# Patient Record
Sex: FEMALE | Race: WHITE | Employment: UNEMPLOYED | URBAN - METROPOLITAN AREA
[De-identification: names, ages, dates, MRNs, and addresses within clinical notes are randomized per-mention and may not be internally consistent; named-entity substitution may affect disease eponyms.]

---

## 2019-09-18 ENCOUNTER — HOSPITAL ENCOUNTER (EMERGENCY)
Age: 54
Discharge: LEFT BEFORE DISCHARGE | End: 2019-09-18
Payer: COMMERCIAL

## 2019-09-18 VITALS
DIASTOLIC BLOOD PRESSURE: 122 MMHG | BODY MASS INDEX: 22.2 KG/M2 | TEMPERATURE: 97.8 F | HEART RATE: 95 BPM | HEIGHT: 64 IN | SYSTOLIC BLOOD PRESSURE: 165 MMHG | WEIGHT: 130 LBS | RESPIRATION RATE: 16 BRPM | OXYGEN SATURATION: 98 %

## 2019-09-18 DIAGNOSIS — I10 HYPERTENSION, UNSPECIFIED TYPE: ICD-10-CM

## 2019-09-18 DIAGNOSIS — T50.905A ADVERSE EFFECT OF DRUG, INITIAL ENCOUNTER: Primary | ICD-10-CM

## 2019-09-18 PROCEDURE — 99283 EMERGENCY DEPT VISIT LOW MDM: CPT

## 2019-09-18 NOTE — ED TRIAGE NOTES
Pt brought to the ED by EMS for c/o anxiety and possible reaction to amlodipine which she just started taking two days ago. Pt reports home issues with her daughter that has caused the anxiousness as well as pt has decreased her daily drinking from almost 12 beers daily down to 6beers daily. Pt reports having only two beers today. Pt is A&O x4 at this time, denies any pain and is tearful but cooperative. Pt denies any SI or HI.

## 2019-09-18 NOTE — ED NOTES
Bed: ED-14  Expected date:   Expected time:   Means of arrival:   Comments:  Jose Corral RN  09/18/19 [id number]

## 2019-09-18 NOTE — ED NOTES
Pt reports anxiety. States she is under a lot of stress. Pt has custody of her Grandchildren. States her son is in senior living, and she called the police on her daughter, who is a heroin addict. Pt started on Amlodipine 2 days ago, last dose was yesterday. Pt thinks she may be having a reaction. Pt states she feels \"high\". Pt reports drinking etoh daily, but has cut down from 12 beers daily to 6 recently.      Terry Joseph RN  09/18/19 [id number]

## 2019-09-18 NOTE — ED PROVIDER NOTES
(36.6 °C) (Oral)   Resp 16   Ht 5' 4\" (1.626 m)   Wt 130 lb (59 kg)   SpO2 98%   BMI 22.31 kg/m²    Constitutional:  Well developed, well nourished, no acute distress, pacing in the room. HENT:  NC/AT. External ears normal, canals clear, fluid visible behind bilateral TMs, no erythema. Nares patent. Oropharynx moist.  Neck:  Supple. Respiratory:  Lungs CTAB. Cardiovascular:  RRR. Vascular:  Distal pulses intact. GI:  Abdomen soft, non-tender. Bowel sounds active. Musculoskeletal:  No edema, no calf tenderness. Integument:  Warm and dry. No petechiae. Neurologic:  Alert & oriented. Psych: Pleasant affect. EKG    Patient refused. RADIOLOGY/PROCEDURES    Labs Reviewed - No data to display    No results found. ED COURSE & MEDICAL DECISION MAKING    Pertinent Labs & Imaging studies reviewed and interpreted. (See chart for details)  -  Patient seen and evaluated in the emergency department. -  Triage and nursing notes reviewed and incorporated. -  Old chart records reviewed and incorporated. -  Supervising physician was Dr. Anthony Montesinos. Patient was seen independently. -  Differential diagnosis includes: ACS, CHF, MI, PE, thoracic dissection, pericarditis, pericardial effusion, pneumonia, pneumothorax, GI cause, and others. -  Upon evaluation, patient states she believes this all to be panic attacks. Although possible, I recommended evaluation with an EKG, labs, and CXR particularly given patient's elevated BP. She initially agreed to have the EKG performed ONLY, however, when nurse went in to perform the EKG, she states she just wanted to leave. Again she was encouraged to stay for evaluation, advised that we cannot attribute this all to stress/anxiety without ruling out other causes first.  Patient continued to refuse, understands risks associated with leaving including worsening symptoms and possible death, signed out AMA. FINAL IMPRESSION    1.  Adverse effect of drug, initial

## 2021-09-07 ENCOUNTER — HOSPITAL ENCOUNTER (INPATIENT)
Age: 56
LOS: 3 days | Discharge: HOME | DRG: 871 | End: 2021-09-10
Attending: STUDENT IN AN ORGANIZED HEALTH CARE EDUCATION/TRAINING PROGRAM | Admitting: INTERNAL MEDICINE
Payer: COMMERCIAL

## 2021-09-07 ENCOUNTER — APPOINTMENT (OUTPATIENT)
Dept: GENERAL RADIOLOGY | Age: 56
DRG: 871 | End: 2021-09-07
Payer: COMMERCIAL

## 2021-09-07 ENCOUNTER — APPOINTMENT (OUTPATIENT)
Dept: CT IMAGING | Age: 56
DRG: 871 | End: 2021-09-07
Payer: COMMERCIAL

## 2021-09-07 DIAGNOSIS — E87.1 HYPONATREMIA: Primary | ICD-10-CM

## 2021-09-07 PROBLEM — U07.1 COVID-19: Status: ACTIVE | Noted: 2021-09-07

## 2021-09-07 LAB
ALBUMIN SERPL-MCNC: 3.9 GM/DL (ref 3.4–5)
ALCOHOL SCREEN SERUM: 0.25 %WT/VOL
ALP BLD-CCNC: 99 IU/L (ref 40–128)
ALT SERPL-CCNC: 150 U/L (ref 10–40)
ANION GAP SERPL CALCULATED.3IONS-SCNC: 16 MMOL/L (ref 4–16)
ANION GAP SERPL CALCULATED.3IONS-SCNC: 18 MMOL/L (ref 4–16)
ANION GAP SERPL CALCULATED.3IONS-SCNC: 18 MMOL/L (ref 4–16)
ANION GAP SERPL CALCULATED.3IONS-SCNC: 20 MMOL/L (ref 4–16)
ANION GAP SERPL CALCULATED.3IONS-SCNC: 21 MMOL/L (ref 4–16)
AST SERPL-CCNC: 229 IU/L (ref 15–37)
BASOPHILS ABSOLUTE: 0 K/CU MM
BASOPHILS RELATIVE PERCENT: 0.2 % (ref 0–1)
BILIRUB SERPL-MCNC: 0.4 MG/DL (ref 0–1)
BUN BLDV-MCNC: 4 MG/DL (ref 6–23)
BUN BLDV-MCNC: 4 MG/DL (ref 6–23)
BUN BLDV-MCNC: 5 MG/DL (ref 6–23)
BUN BLDV-MCNC: 5 MG/DL (ref 6–23)
BUN BLDV-MCNC: 6 MG/DL (ref 6–23)
CALCIUM SERPL-MCNC: 8.1 MG/DL (ref 8.3–10.6)
CALCIUM SERPL-MCNC: 8.5 MG/DL (ref 8.3–10.6)
CALCIUM SERPL-MCNC: 8.6 MG/DL (ref 8.3–10.6)
CALCIUM SERPL-MCNC: 8.9 MG/DL (ref 8.3–10.6)
CALCIUM SERPL-MCNC: 9 MG/DL (ref 8.3–10.6)
CHLORIDE BLD-SCNC: 80 MMOL/L (ref 99–110)
CHLORIDE BLD-SCNC: 92 MMOL/L (ref 99–110)
CHLORIDE BLD-SCNC: 96 MMOL/L (ref 99–110)
CHLORIDE BLD-SCNC: 97 MMOL/L (ref 99–110)
CHLORIDE BLD-SCNC: 97 MMOL/L (ref 99–110)
CO2: 17 MMOL/L (ref 21–32)
CO2: 20 MMOL/L (ref 21–32)
CO2: 20 MMOL/L (ref 21–32)
CO2: 21 MMOL/L (ref 21–32)
CO2: 23 MMOL/L (ref 21–32)
CREAT SERPL-MCNC: 0.3 MG/DL (ref 0.6–1.1)
CREAT SERPL-MCNC: 0.3 MG/DL (ref 0.6–1.1)
CREAT SERPL-MCNC: 0.4 MG/DL (ref 0.6–1.1)
DIFFERENTIAL TYPE: ABNORMAL
EOSINOPHILS ABSOLUTE: 0 K/CU MM
EOSINOPHILS RELATIVE PERCENT: 0 % (ref 0–3)
GFR AFRICAN AMERICAN: >60 ML/MIN/1.73M2
GFR NON-AFRICAN AMERICAN: >60 ML/MIN/1.73M2
GLUCOSE BLD-MCNC: 105 MG/DL (ref 70–99)
GLUCOSE BLD-MCNC: 116 MG/DL (ref 70–99)
GLUCOSE BLD-MCNC: 142 MG/DL (ref 70–99)
GLUCOSE BLD-MCNC: 76 MG/DL (ref 70–99)
GLUCOSE BLD-MCNC: 92 MG/DL (ref 70–99)
GLUCOSE BLD-MCNC: 97 MG/DL (ref 70–99)
HCT VFR BLD CALC: 44.2 % (ref 37–47)
HEMOGLOBIN: 15.7 GM/DL (ref 12.5–16)
IMMATURE NEUTROPHIL %: 1.1 % (ref 0–0.43)
INR BLD: 0.82 INDEX
LIPASE: 104 IU/L (ref 13–60)
LYMPHOCYTES ABSOLUTE: 0.4 K/CU MM
LYMPHOCYTES RELATIVE PERCENT: 4.9 % (ref 24–44)
MCH RBC QN AUTO: 34.8 PG (ref 27–31)
MCHC RBC AUTO-ENTMCNC: 35.5 % (ref 32–36)
MCV RBC AUTO: 98 FL (ref 78–100)
MONOCYTES ABSOLUTE: 0.9 K/CU MM
MONOCYTES RELATIVE PERCENT: 11.3 % (ref 0–4)
NUCLEATED RBC %: 0 %
PDW BLD-RTO: 13.5 % (ref 11.7–14.9)
PLATELET # BLD: 113 K/CU MM (ref 140–440)
PMV BLD AUTO: 11.1 FL (ref 7.5–11.1)
POTASSIUM SERPL-SCNC: 3.4 MMOL/L (ref 3.5–5.1)
POTASSIUM SERPL-SCNC: 3.6 MMOL/L (ref 3.5–5.1)
POTASSIUM SERPL-SCNC: 3.6 MMOL/L (ref 3.5–5.1)
POTASSIUM SERPL-SCNC: 3.8 MMOL/L (ref 3.5–5.1)
POTASSIUM SERPL-SCNC: 3.9 MMOL/L (ref 3.5–5.1)
PROCALCITONIN: 2.05
PROTHROMBIN TIME: 10.5 SECONDS (ref 11.7–14.5)
RBC # BLD: 4.51 M/CU MM (ref 4.2–5.4)
SARS-COV-2, NAAT: DETECTED
SEGMENTED NEUTROPHILS ABSOLUTE COUNT: 6.7 K/CU MM
SEGMENTED NEUTROPHILS RELATIVE PERCENT: 82.5 % (ref 36–66)
SODIUM BLD-SCNC: 120 MMOL/L (ref 135–145)
SODIUM BLD-SCNC: 130 MMOL/L (ref 135–145)
SODIUM BLD-SCNC: 134 MMOL/L (ref 135–145)
SODIUM BLD-SCNC: 136 MMOL/L (ref 135–145)
SODIUM BLD-SCNC: 136 MMOL/L (ref 135–145)
SOURCE: ABNORMAL
TOTAL IMMATURE NEUTOROPHIL: 0.09 K/CU MM
TOTAL NUCLEATED RBC: 0 K/CU MM
TOTAL PROTEIN: 7.2 GM/DL (ref 6.4–8.2)
WBC # BLD: 8.2 K/CU MM (ref 4–10.5)

## 2021-09-07 PROCEDURE — 83690 ASSAY OF LIPASE: CPT

## 2021-09-07 PROCEDURE — 2060000000 HC ICU INTERMEDIATE R&B

## 2021-09-07 PROCEDURE — 6370000000 HC RX 637 (ALT 250 FOR IP): Performed by: STUDENT IN AN ORGANIZED HEALTH CARE EDUCATION/TRAINING PROGRAM

## 2021-09-07 PROCEDURE — 99285 EMERGENCY DEPT VISIT HI MDM: CPT

## 2021-09-07 PROCEDURE — 6360000002 HC RX W HCPCS: Performed by: INTERNAL MEDICINE

## 2021-09-07 PROCEDURE — G0480 DRUG TEST DEF 1-7 CLASSES: HCPCS

## 2021-09-07 PROCEDURE — 1200000000 HC SEMI PRIVATE

## 2021-09-07 PROCEDURE — 85025 COMPLETE CBC W/AUTO DIFF WBC: CPT

## 2021-09-07 PROCEDURE — 96374 THER/PROPH/DIAG INJ IV PUSH: CPT

## 2021-09-07 PROCEDURE — 70450 CT HEAD/BRAIN W/O DYE: CPT

## 2021-09-07 PROCEDURE — 36415 COLL VENOUS BLD VENIPUNCTURE: CPT

## 2021-09-07 PROCEDURE — 94761 N-INVAS EAR/PLS OXIMETRY MLT: CPT

## 2021-09-07 PROCEDURE — 2580000003 HC RX 258: Performed by: STUDENT IN AN ORGANIZED HEALTH CARE EDUCATION/TRAINING PROGRAM

## 2021-09-07 PROCEDURE — 72125 CT NECK SPINE W/O DYE: CPT

## 2021-09-07 PROCEDURE — 80048 BASIC METABOLIC PNL TOTAL CA: CPT

## 2021-09-07 PROCEDURE — 2700000000 HC OXYGEN THERAPY PER DAY

## 2021-09-07 PROCEDURE — 80053 COMPREHEN METABOLIC PANEL: CPT

## 2021-09-07 PROCEDURE — 71045 X-RAY EXAM CHEST 1 VIEW: CPT

## 2021-09-07 PROCEDURE — 85610 PROTHROMBIN TIME: CPT

## 2021-09-07 PROCEDURE — 6360000002 HC RX W HCPCS: Performed by: STUDENT IN AN ORGANIZED HEALTH CARE EDUCATION/TRAINING PROGRAM

## 2021-09-07 PROCEDURE — 87635 SARS-COV-2 COVID-19 AMP PRB: CPT

## 2021-09-07 PROCEDURE — 84145 PROCALCITONIN (PCT): CPT

## 2021-09-07 PROCEDURE — 2580000003 HC RX 258: Performed by: INTERNAL MEDICINE

## 2021-09-07 PROCEDURE — 82962 GLUCOSE BLOOD TEST: CPT

## 2021-09-07 PROCEDURE — 94640 AIRWAY INHALATION TREATMENT: CPT

## 2021-09-07 RX ORDER — SODIUM CHLORIDE 0.9 % (FLUSH) 0.9 %
5-40 SYRINGE (ML) INJECTION PRN
Status: DISCONTINUED | OUTPATIENT
Start: 2021-09-07 | End: 2021-09-10 | Stop reason: HOSPADM

## 2021-09-07 RX ORDER — ACETAMINOPHEN 325 MG/1
650 TABLET ORAL EVERY 6 HOURS PRN
Status: DISCONTINUED | OUTPATIENT
Start: 2021-09-07 | End: 2021-09-10 | Stop reason: HOSPADM

## 2021-09-07 RX ORDER — ONDANSETRON 2 MG/ML
4 INJECTION INTRAMUSCULAR; INTRAVENOUS EVERY 6 HOURS PRN
Status: DISCONTINUED | OUTPATIENT
Start: 2021-09-07 | End: 2021-09-10 | Stop reason: HOSPADM

## 2021-09-07 RX ORDER — LORAZEPAM 2 MG/ML
3 INJECTION INTRAMUSCULAR
Status: DISCONTINUED | OUTPATIENT
Start: 2021-09-07 | End: 2021-09-10 | Stop reason: HOSPADM

## 2021-09-07 RX ORDER — LORAZEPAM 1 MG/1
1 TABLET ORAL
Status: DISCONTINUED | OUTPATIENT
Start: 2021-09-07 | End: 2021-09-10 | Stop reason: HOSPADM

## 2021-09-07 RX ORDER — ACETAMINOPHEN 650 MG/1
650 SUPPOSITORY RECTAL EVERY 6 HOURS PRN
Status: DISCONTINUED | OUTPATIENT
Start: 2021-09-07 | End: 2021-09-10 | Stop reason: HOSPADM

## 2021-09-07 RX ORDER — LORAZEPAM 1 MG/1
2 TABLET ORAL
Status: DISCONTINUED | OUTPATIENT
Start: 2021-09-07 | End: 2021-09-10 | Stop reason: HOSPADM

## 2021-09-07 RX ORDER — SODIUM CHLORIDE 9 MG/ML
INJECTION, SOLUTION INTRAVENOUS CONTINUOUS
Status: DISCONTINUED | OUTPATIENT
Start: 2021-09-07 | End: 2021-09-08

## 2021-09-07 RX ORDER — POLYETHYLENE GLYCOL 3350 17 G/17G
17 POWDER, FOR SOLUTION ORAL DAILY PRN
Status: DISCONTINUED | OUTPATIENT
Start: 2021-09-07 | End: 2021-09-10 | Stop reason: HOSPADM

## 2021-09-07 RX ORDER — METHYLPREDNISOLONE SODIUM SUCCINATE 125 MG/2ML
80 INJECTION, POWDER, LYOPHILIZED, FOR SOLUTION INTRAMUSCULAR; INTRAVENOUS ONCE
Status: COMPLETED | OUTPATIENT
Start: 2021-09-07 | End: 2021-09-07

## 2021-09-07 RX ORDER — ALBUTEROL SULFATE 2.5 MG/3ML
5 SOLUTION RESPIRATORY (INHALATION) ONCE
Status: COMPLETED | OUTPATIENT
Start: 2021-09-07 | End: 2021-09-07

## 2021-09-07 RX ORDER — ACETAMINOPHEN 325 MG/1
650 TABLET ORAL ONCE
Status: COMPLETED | OUTPATIENT
Start: 2021-09-07 | End: 2021-09-07

## 2021-09-07 RX ORDER — LORAZEPAM 2 MG/ML
1 INJECTION INTRAMUSCULAR
Status: DISCONTINUED | OUTPATIENT
Start: 2021-09-07 | End: 2021-09-10 | Stop reason: HOSPADM

## 2021-09-07 RX ORDER — IPRATROPIUM BROMIDE AND ALBUTEROL SULFATE 2.5; .5 MG/3ML; MG/3ML
1 SOLUTION RESPIRATORY (INHALATION) ONCE
Status: COMPLETED | OUTPATIENT
Start: 2021-09-07 | End: 2021-09-07

## 2021-09-07 RX ORDER — LORAZEPAM 1 MG/1
4 TABLET ORAL
Status: DISCONTINUED | OUTPATIENT
Start: 2021-09-07 | End: 2021-09-10 | Stop reason: HOSPADM

## 2021-09-07 RX ORDER — SODIUM CHLORIDE 0.9 % (FLUSH) 0.9 %
5-40 SYRINGE (ML) INJECTION EVERY 12 HOURS SCHEDULED
Status: DISCONTINUED | OUTPATIENT
Start: 2021-09-07 | End: 2021-09-10 | Stop reason: HOSPADM

## 2021-09-07 RX ORDER — 0.9 % SODIUM CHLORIDE 0.9 %
1000 INTRAVENOUS SOLUTION INTRAVENOUS ONCE
Status: COMPLETED | OUTPATIENT
Start: 2021-09-07 | End: 2021-09-07

## 2021-09-07 RX ORDER — ONDANSETRON 4 MG/1
4 TABLET, ORALLY DISINTEGRATING ORAL EVERY 8 HOURS PRN
Status: DISCONTINUED | OUTPATIENT
Start: 2021-09-07 | End: 2021-09-10 | Stop reason: HOSPADM

## 2021-09-07 RX ORDER — LORAZEPAM 2 MG/ML
2 INJECTION INTRAMUSCULAR
Status: DISCONTINUED | OUTPATIENT
Start: 2021-09-07 | End: 2021-09-10 | Stop reason: HOSPADM

## 2021-09-07 RX ORDER — SODIUM CHLORIDE 9 MG/ML
25 INJECTION, SOLUTION INTRAVENOUS PRN
Status: DISCONTINUED | OUTPATIENT
Start: 2021-09-07 | End: 2021-09-10 | Stop reason: HOSPADM

## 2021-09-07 RX ORDER — DEXAMETHASONE 4 MG/1
6 TABLET ORAL DAILY
Status: DISCONTINUED | OUTPATIENT
Start: 2021-09-07 | End: 2021-09-10

## 2021-09-07 RX ORDER — LORAZEPAM 2 MG/ML
4 INJECTION INTRAMUSCULAR
Status: DISCONTINUED | OUTPATIENT
Start: 2021-09-07 | End: 2021-09-10 | Stop reason: HOSPADM

## 2021-09-07 RX ORDER — LORAZEPAM 1 MG/1
3 TABLET ORAL
Status: DISCONTINUED | OUTPATIENT
Start: 2021-09-07 | End: 2021-09-10 | Stop reason: HOSPADM

## 2021-09-07 RX ADMIN — ENOXAPARIN SODIUM 30 MG: 30 INJECTION SUBCUTANEOUS at 20:15

## 2021-09-07 RX ADMIN — METHYLPREDNISOLONE SODIUM SUCCINATE 80 MG: 125 INJECTION, POWDER, LYOPHILIZED, FOR SOLUTION INTRAMUSCULAR; INTRAVENOUS at 04:33

## 2021-09-07 RX ADMIN — ALBUTEROL SULFATE 5 MG: 2.5 SOLUTION RESPIRATORY (INHALATION) at 04:15

## 2021-09-07 RX ADMIN — IPRATROPIUM BROMIDE AND ALBUTEROL SULFATE 1 AMPULE: .5; 3 SOLUTION RESPIRATORY (INHALATION) at 04:20

## 2021-09-07 RX ADMIN — SODIUM CHLORIDE 1000 ML: 9 INJECTION, SOLUTION INTRAVENOUS at 06:37

## 2021-09-07 RX ADMIN — SODIUM CHLORIDE: 9 INJECTION, SOLUTION INTRAVENOUS at 10:25

## 2021-09-07 RX ADMIN — ACETAMINOPHEN 650 MG: 325 TABLET ORAL at 04:33

## 2021-09-07 RX ADMIN — DEXAMETHASONE 6 MG: 4 TABLET ORAL at 10:24

## 2021-09-07 RX ADMIN — ENOXAPARIN SODIUM 30 MG: 30 INJECTION SUBCUTANEOUS at 10:25

## 2021-09-07 RX ADMIN — DEXAMETHASONE 6 MG: 4 TABLET ORAL at 07:10

## 2021-09-07 ASSESSMENT — PAIN SCALES - GENERAL: PAINLEVEL_OUTOF10: 6

## 2021-09-07 ASSESSMENT — LIFESTYLE VARIABLES
HOW OFTEN DO YOU HAVE A DRINK CONTAINING ALCOHOL: 4 OR MORE TIMES A WEEK
HOW MANY STANDARD DRINKS CONTAINING ALCOHOL DO YOU HAVE ON A TYPICAL DAY: 10 OR MORE

## 2021-09-07 NOTE — CARE COORDINATION
Chart reviewed. Patient is from home; appears independent PTA. She does not have a PCP at this time; list added to AVS. She has insurance that assists with Rx when needed. CM will contact patient by phone to discuss consult (Rehab- ED MD documented ETOH use of 12 beers daily. David Stephens RN     1020 Attempted to contact patient by phone; no answer. CM will continue to attempt contact.  David Stephens RN

## 2021-09-07 NOTE — ED TRIAGE NOTES
called EMS [facility]). Pt was found on the ground by , it is assumed she fell. Per  she is an alchoholic. Pt is altered, has been incontinent of urine, and has been combative per EMS.

## 2021-09-07 NOTE — ED PROVIDER NOTES
EMERGENCY DEPARTMENT ENCOUNTER      CHIEF COMPLAINT    Chief Complaint   Patient presents with    Shortness of Breath    Loss of Consciousness    Alcohol Intoxication    Fall       HPI    Veto Fothergill is a 63 y.o. female with history significant for alcohol use, COPD not on home oxygen, hypertension who presents with shortness of breath alcohol intoxication fall and possible loss of consciousness. Patient arrived by ambulance,  stated that while he woke up tonight he heard a thump in the bathroom and that he found the patient on the floor in a pile of urine, therefore he called the squad, patient is intoxicated stated that she did drink a few hours ago, does not remember the episode of the fall does not know whether she has loss of consciousness denies any headache nausea or vomiting  did state that patient has been having shortness of breath for the last few days and been having some generalized body ache and patient did have a fever here and the son has been sick, patient has not been vaccinated against COVID. REVIEW OF SYSTEMS    Constitutional: Denies chills, fatigue, unexpected weight loss or fever. HENT: Denies sore throat or rhinorrhea. Eyes: Denies vision changes. Respiratory: + shortness of breath or cough. Cardiovascular: Denies chest pain, leg swelling or palpitations. Gastrointestinal: Denies abdominal pain, diarrhea, nausea and vomiting. Genitourinary: Denies dysuria or hematuria. Skin: Denies rashes or wounds. MSK: Denies joint pain. Neurologic: Denies headache, lightheadedness, numbness, or weakness.  ?  LOC  Hematologic/lymphatic: Denies unexpected weight loss, night sweats  Endocrine: No polyuria, polydipsia, or polyphagia      Pertinent positives and negatives are delineated in HPI and ROS above, all other systems are reviewed and are negative    PAST MEDICAL HISTORY    Past Medical History:   Diagnosis Date    Hypertension      Medical history reviewed and no pertinent past medical history other than the ones mentioned above    SURGICAL HISTORY    Past Surgical History:   Procedure Laterality Date    CHOLECYSTECTOMY      DILATION AND CURETTAGE OF UTERUS      two of them     Surgical history reviewed and no pertinent surgical history other than the ones mentioned above    CURRENT MEDICATIONS    Current Outpatient Rx   Medication Sig Dispense Refill    naproxen (NAPROSYN) 375 MG tablet Take 1 tablet by mouth 2 times daily as needed for Pain 60 tablet 3    ibuprofen (ADVIL;MOTRIN) 600 MG tablet Take 1 tablet by mouth every 6 hours as needed for Pain 30 tablet 1    HYDROcodone-acetaminophen (NORCO) 5-325 MG per tablet Take 1-2 tablets by mouth every 4 hours as needed for Pain 15 tablet 0    vitamin B-1 (THIAMINE) 100 MG tablet Take 1 tablet by mouth daily. 30 tablet 2    folic acid (FOLVITE) 1 MG tablet Take 1 tablet by mouth daily. 30 tablet 2     Medication is reviewed    ALLERGIES    No Known Allergies  Allergy is reviewed    FAMILY HISTORY    History reviewed. No pertinent family history. Family history reviewed and no pertinent family history other than the ones mentioned above    SOCIAL HISTORY    Social History     Socioeconomic History    Marital status:      Spouse name: Not on file    Number of children: Not on file    Years of education: Not on file    Highest education level: Not on file   Occupational History    Not on file   Tobacco Use    Smoking status: Current Every Day Smoker     Packs/day: 1.00     Types: Cigarettes    Smokeless tobacco: Never Used   Substance and Sexual Activity    Alcohol use:  Yes     Alcohol/week: 12.0 standard drinks     Types: 12 Cans of beer per week     Comment: 12 cans a day    Drug use: No    Sexual activity: Not on file   Other Topics Concern    Not on file   Social History Narrative    Not on file     Social Determinants of Health     Financial Resource Strain:     Difficulty of Paying Living Expenses:    Food Insecurity:     Worried About [address] in the Last Year:     [address] in the Last Year:    Transportation Needs:     Lack of Transportation (Medical):  Lack of Transportation (Non-Medical):    Physical Activity:     Days of Exercise per Week:     Minutes of Exercise per Session:    Stress:     Feeling of Stress :    Social Connections:     Frequency of Communication with Friends and Family:     Frequency of Social Gatherings with Friends and Family:     Attends Voodoo Services:     Active Member of Clubs or Organizations:     Attends Club or Organization Meetings:     Marital Status:    Intimate Partner Violence:     Fear of Current or Ex-Partner:     Emotionally Abused:     Physically Abused:     Sexually Abused:      Live with   Alcohol and recreational drug use: Alcohol use  Social history reviewed and no pertinent social history other than the ones mentioned above    PHYSICAL EXAM    Vital Signs:/62   Pulse 107   Temp 100.4 °F (38 °C) (Oral)   Resp 18   Wt 130 lb (59 kg)   SpO2 90%   BMI 22.31 kg/m²   I have reviewed the triage vital signs. Constitutional: Intoxicated in no respiratory distress, febrile  Eyes: PERRL, no conjunctival injection  HENT: NCAT, Neck supple without meningismus   CV: Sinus tachycardic, Warm, no edema, no chest wall tenderness  RESP: Normal RR, no increased respiratory efforts, bilateral wheezes with rhonchi's  GI: soft, non-distended, nontender no bruises  MSK: No gross deformities no cervical thoracolumbar spine pain  Skin: Warm, dry. No rashes  Neuro: Alert, CNs II-XII grossly intact. Moving all 4 extremities  Psych: Appropriate mood and affect.     Labs:   Labs Reviewed   COVID-19, RAPID - Abnormal; Notable for the following components:       Result Value    SARS-CoV-2, NAAT DETECTED (*)     All other components within normal limits   CBC WITH AUTO DIFFERENTIAL - Abnormal; Notable for the following components:    MCH 34.8 (*)     Platelets 107 (*)     Segs Relative 82.5 (*)     Lymphocytes % 4.9 (*)     Monocytes % 11.3 (*)     Immature Neutrophil % 1.1 (*)     All other components within normal limits   COMPREHENSIVE METABOLIC PANEL W/ REFLEX TO MG FOR LOW K - Abnormal; Notable for the following components:    Sodium 120 (*)     Chloride 80 (*)     CO2 20 (*)     BUN 5 (*)     CREATININE 0.4 (*)      (*)      (*)     Anion Gap 20 (*)     All other components within normal limits   LIPASE - Abnormal; Notable for the following components:    Lipase 104 (*)     All other components within normal limits   PROTIME-INR - Abnormal; Notable for the following components:    Protime 10.5 (*)     All other components within normal limits   ETHANOL - Abnormal; Notable for the following components:    Alcohol Scrn 0.25 (*)     All other components within normal limits   URINE RT REFLEX TO CULTURE   URINE DRUG SCREEN   OSMOLALITY, URINE   ELECTROLYTES URINE RANDOM   CREATININE, RANDOM URINE   BASIC METABOLIC PANEL   BASIC METABOLIC PANEL   BASIC METABOLIC PANEL   POCT GLUCOSE       Radiology:  XR CHEST PORTABLE   Final Result   Middle lobe atelectasis or pneumonia. CT Cervical Spine WO Contrast   Final Result   1. No evidence of acute intracranial trauma. 2. Moderate cerebral white matter chronic microvascular ischemic disease. 3. No evidence of acute cervical spine abnormality. 4. Note: Evaluation of cervical spine limited by patient motion related   artifact. CT Head WO Contrast   Final Result   1. No evidence of acute intracranial trauma. 2. Moderate cerebral white matter chronic microvascular ischemic disease. 3. No evidence of acute cervical spine abnormality. 4. Note: Evaluation of cervical spine limited by patient motion related   artifact.              I directly reviewed the images and radiology interpretation    ED COURSE  Assessment & Medical Decision Making:  Nivia Bain Aicha Norman is a 63 y.o. female who presents with fall unknown was mechanical versus syncopal patient is intoxicated, cannot clear C-spine, CT head and cervical spine was unremarkable, patient did have low saturation 90% initially, patient's x-ray did demonstrate middle lobe atelectasis versus pneumonia patient does have positive Covid testing patient was treated for COPD exacerbation with aerosol and steroids which will cover for Covid hypoxia as well patient does appear to be dehydrated was noticed to have a sodium 120 which is new compared to prior, patient is given 1 L of normal saline for sodium corrections, patient will require admission to the hospital for hypoxia from COPD exacerbation COVID-19 and also hyponatremia, from a trauma perspective patient is cleared. DDx includes but not limited to:  PNA, PE, HF exacerbation, volume overload from cirrhosis, volume overload from renal dysfunction, anemia, CNS, acidosis, ACS, anxiety, PTX, pleural effusion, bronchiectasis, airway obstruction, reactive airway disease exacerbation (asthma/COPD/WARI), anaphylaxis/anaphylactoid reaction/allergic reaction      Workup includes but not limited to: Labs, CT, x-ray, urine    Treatment includes but not limited to: Aerosols, steroids, saline    Critical care time: NA    Impression:   1. Fall  2. Hyponatremia  3. COVID-19  4. COPD exacerbation    Disposition: Admission    This note dictated using Dragon medical voice recognition software. Attempts at proofreading were made, but errors may occasionally still occur.            Shirley Gavin MD  09/07/21 5006

## 2021-09-07 NOTE — ED NOTES
XR CHEST PORTABLE  Status: Final result   Order Providers    Authorizing Billing   MD Felisa Morales MD          Signed by    Signed Date/Time Phone Pager   Abdelrahman Mistry 9/07/2021  5:20 [phone]    Reading Providers    Read Date Phone Pager   Jerry Rhodes Sep 7, 2021  5:20 [phone]    Radiation Dose Estimates    No radiation information found for this patient   Narrative   EXAMINATION:   ONE XRAY VIEW OF THE CHEST       9/7/2021 4:08 am       COMPARISON:   05/04/2013       HISTORY:   ORDERING SYSTEM PROVIDED HISTORY: SOB   TECHNOLOGIST PROVIDED HISTORY:   Reason for exam:->SOB   Reason for Exam: SOB   Acuity: Unknown   Type of Exam: Unknown       FINDINGS:   There is right basilar airspace opacity partially obscuring the right heart   border.  The left lung is clear.  The heart size is within normal limits. The costophrenic angles are sharp.  There is no discernible pneumothorax.           Impression   Middle lobe atelectasis or pneumonia.           Marlen Rosen RN  09/07/21 [id number]

## 2021-09-07 NOTE — H&P
Comment: 12 cans a day    Drug use: No    Sexual activity: None   Other Topics Concern    None   Social History Narrative    None     Social Determinants of Health     Financial Resource Strain:     Difficulty of Paying Living Expenses:    Food Insecurity:     Worried About Running Out of Food in the Last Year:     [address] in the Last Year:    Transportation Needs:     Lack of Transportation (Medical):  Lack of Transportation (Non-Medical):    Physical Activity:     Days of Exercise per Week:     Minutes of Exercise per Session:    Stress:     Feeling of Stress :    Social Connections:     Frequency of Communication with Friends and Family:     Frequency of Social Gatherings with Friends and Family:     Attends Scientology Services:     Active Member of Clubs or Organizations:     Attends Club or Organization Meetings:     Marital Status:    Intimate Partner Violence:     Fear of Current or Ex-Partner:     Emotionally Abused:     Physically Abused:     Sexually Abused:        MEDICATIONS   Medications Prior to Admission  Not in a hospital admission. Current Medications  No current facility-administered medications for this encounter. Current Outpatient Medications   Medication Sig Dispense Refill    naproxen (NAPROSYN) 375 MG tablet Take 1 tablet by mouth 2 times daily as needed for Pain 60 tablet 3    ibuprofen (ADVIL;MOTRIN) 600 MG tablet Take 1 tablet by mouth every 6 hours as needed for Pain 30 tablet 1    HYDROcodone-acetaminophen (NORCO) 5-325 MG per tablet Take 1-2 tablets by mouth every 4 hours as needed for Pain 15 tablet 0    vitamin B-1 (THIAMINE) 100 MG tablet Take 1 tablet by mouth daily. 30 tablet 2    folic acid (FOLVITE) 1 MG tablet Take 1 tablet by mouth daily. 30 tablet 2         Allergies  No Known Allergies    REVIEW OF SYSTEMS   Within above limitations. 14 point review of systems reviewed.  Pertinent positive or negative as per HPI or otherwise negative per 14 point systems review. PHYSICAL EXAM     Wt Readings from Last 3 Encounters:   09/07/21 130 lb (59 kg)   09/18/19 130 lb (59 kg)   11/26/17 120 lb (54.4 kg)       Blood pressure 103/62, pulse 107, temperature 100.4 °F (38 °C), temperature source Oral, resp. rate 18, weight 130 lb (59 kg), SpO2 90 %. General - AAO x 3  Psych - Appropriate affect/speech. No agitation  Eyes - Eye lids intact. No scleral icterus  ENT - Lips wnl. External ear clear/dry/intact. No thyromegaly on inspection  Neuro - No gross peripheral or central neuro deficits on inspection  Heart - Sinus. RRR. S1 and S2 present. No added HS/murmurs appreciated. No elevated JVD appreciated  Lung - Adequate air entry b/l, No crackles/wheezes appreciated  GI - Soft. No guarding/rigidity. No hepatosplenomegaly/ascites. BS+   - No CVA/suprapubic tenderness or palpable bladder distension  Skin - Intact. No rash/petechiae/ecchymosis. Warm extremities  MSK - Joints with normal ROM.  No joint swellings    Lines/Drains/Airways/Wounds:  [unfilled]    LABS AND IMAGING   CBC  [unfilled]    Last 3 Hemoglobin  Lab Results   Component Value Date    HGB 15.7 09/07/2021    HGB 14.4 11/26/2017    HGB 14.8 05/04/2013     Last 3 WBC/ANC  Lab Results   Component Value Date    WBC 8.2 09/07/2021    WBC 5.7 11/26/2017    WBC 7.5 05/04/2013     No components found for: GRNLOCTYABS  Last 3 Platelets  No results found for: PLATELET  Chemistry  [unfilled]  [unfilled]  No results found for: LDH  Coagulation Studies  Lab Results   Component Value Date    INR 0.82 09/07/2021     Liver Function Studies  Lab Results   Component Value Date     09/07/2021     09/07/2021    ALKPHOS 99 09/07/2021       Recent Imaging        Relevant labs and imaging reviewed    ASSESSMENT AND PLAN   Svetlana Martini is a 63 y.o. female p/w    syncope, in the setting of alcohol intoxication  -Not witnessed, with possible head trauma  - ECHO  - CT head with no acute findings  - neurochecks q4 hour  -Cardiology consult    Shortness of breath and fever likely d/t COVID  - CXR with infiltrates  - consider abx if procal elevated and pending hospital course  -COVID-19 testing and related labs: CRP, d-dimer, ferritin, fibrinogen, LDH, lactate, procalcitonin, viral panel  - sputum cuture, legionella antigen, strep pneumonia antigen  - decadron  - PRN O2 via NC  - pulmonary consult/ID if needed      COPD, however less likely exacerbation, hypoxia more driven likely by COVID  - trop wnl  - continous pulse ox, tele  - decadron  - c/w breathing treatments  - pulm consult     Hyponatremia likely d/t beer potomania  -  Received 1L bolus of fluids in ED, may change sodium count needs stat draw  - BMP q4h, until corrected  - do not correct >8 mmol/L/24hr  - DO NOT correct more than 0.5 mmol/hr,    call provider stat and consider nephrology consult       Case d/w ED provider    DVT ppx: Lovenox  Code status: Full code    Pedro, Internal Medicine  9/7/2021 at 5:32 AM

## 2021-09-08 LAB
ALBUMIN SERPL-MCNC: 3.3 GM/DL (ref 3.4–5)
ALP BLD-CCNC: 83 IU/L (ref 40–129)
ALT SERPL-CCNC: 104 U/L (ref 10–40)
AMPHETAMINES: NEGATIVE
ANION GAP SERPL CALCULATED.3IONS-SCNC: 13 MMOL/L (ref 4–16)
ANION GAP SERPL CALCULATED.3IONS-SCNC: 13 MMOL/L (ref 4–16)
ANION GAP SERPL CALCULATED.3IONS-SCNC: 14 MMOL/L (ref 4–16)
ANION GAP SERPL CALCULATED.3IONS-SCNC: 15 MMOL/L (ref 4–16)
ANION GAP SERPL CALCULATED.3IONS-SCNC: 15 MMOL/L (ref 4–16)
ANION GAP SERPL CALCULATED.3IONS-SCNC: 8 MMOL/L (ref 4–16)
APTT: 36.6 SECONDS (ref 25.1–37.1)
AST SERPL-CCNC: 116 IU/L (ref 15–37)
BACTERIA: NEGATIVE /HPF
BANDED NEUTROPHILS ABSOLUTE COUNT: 3.8 K/CU MM
BANDED NEUTROPHILS RELATIVE PERCENT: 29 % (ref 5–11)
BARBITURATE SCREEN URINE: NEGATIVE
BENZODIAZEPINE SCREEN, URINE: NEGATIVE
BILIRUB SERPL-MCNC: 0.3 MG/DL (ref 0–1)
BILIRUBIN URINE: NEGATIVE MG/DL
BLOOD, URINE: ABNORMAL
BUN BLDV-MCNC: 5 MG/DL (ref 6–23)
BUN BLDV-MCNC: 6 MG/DL (ref 6–23)
BUN BLDV-MCNC: 7 MG/DL (ref 6–23)
CALCIUM SERPL-MCNC: 8.3 MG/DL (ref 8.3–10.6)
CALCIUM SERPL-MCNC: 8.6 MG/DL (ref 8.3–10.6)
CALCIUM SERPL-MCNC: 8.6 MG/DL (ref 8.3–10.6)
CALCIUM SERPL-MCNC: 8.8 MG/DL (ref 8.3–10.6)
CANNABINOID SCREEN URINE: NEGATIVE
CHLORIDE BLD-SCNC: 91 MMOL/L (ref 99–110)
CHLORIDE BLD-SCNC: 92 MMOL/L (ref 99–110)
CHLORIDE BLD-SCNC: 92 MMOL/L (ref 99–110)
CHLORIDE BLD-SCNC: 96 MMOL/L (ref 99–110)
CHLORIDE BLD-SCNC: 98 MMOL/L (ref 99–110)
CHLORIDE BLD-SCNC: 99 MMOL/L (ref 99–110)
CHLORIDE URINE RANDOM: 113 MMOL/L (ref 43–210)
CLARITY: CLEAR
CO2: 22 MMOL/L (ref 21–32)
CO2: 22 MMOL/L (ref 21–32)
CO2: 23 MMOL/L (ref 21–32)
CO2: 23 MMOL/L (ref 21–32)
CO2: 25 MMOL/L (ref 21–32)
CO2: 28 MMOL/L (ref 21–32)
COCAINE METABOLITE: NEGATIVE
COLOR: YELLOW
CREAT SERPL-MCNC: 0.3 MG/DL (ref 0.6–1.1)
CREAT SERPL-MCNC: 0.4 MG/DL (ref 0.6–1.1)
CREATININE URINE: 25.3 MG/DL
D DIMER: 542 NG/ML(DDU)
DIFFERENTIAL TYPE: ABNORMAL
FERRITIN: 4178 NG/ML (ref 15–150)
FIBRINOGEN LEVEL: 604 MG/DL (ref 196.9–442.1)
GFR AFRICAN AMERICAN: >60 ML/MIN/1.73M2
GFR NON-AFRICAN AMERICAN: >60 ML/MIN/1.73M2
GLUCOSE BLD-MCNC: 105 MG/DL (ref 70–99)
GLUCOSE BLD-MCNC: 110 MG/DL (ref 70–99)
GLUCOSE BLD-MCNC: 117 MG/DL (ref 70–99)
GLUCOSE BLD-MCNC: 138 MG/DL (ref 70–99)
GLUCOSE BLD-MCNC: 193 MG/DL (ref 70–99)
GLUCOSE BLD-MCNC: 245 MG/DL (ref 70–99)
GLUCOSE, URINE: NEGATIVE MG/DL
HCT VFR BLD CALC: 43.5 % (ref 37–47)
HEMOGLOBIN: 14.6 GM/DL (ref 12.5–16)
HIGH SENSITIVE C-REACTIVE PROTEIN: 214.2 MG/L
INR BLD: 0.72 INDEX
KETONES, URINE: ABNORMAL MG/DL
LACTATE DEHYDROGENASE: 383 IU/L (ref 120–246)
LACTATE: 0.9 MMOL/L (ref 0.4–2)
LEGIONELLA URINARY AG: NEGATIVE
LEUKOCYTE ESTERASE, URINE: NEGATIVE
LYMPHOCYTES ABSOLUTE: 0.3 K/CU MM
LYMPHOCYTES RELATIVE PERCENT: 2 % (ref 24–44)
MCH RBC QN AUTO: 34.5 PG (ref 27–31)
MCHC RBC AUTO-ENTMCNC: 33.6 % (ref 32–36)
MCV RBC AUTO: 102.8 FL (ref 78–100)
MONOCYTES ABSOLUTE: 0.7 K/CU MM
MONOCYTES RELATIVE PERCENT: 5 % (ref 0–4)
MUCUS: ABNORMAL HPF
NITRITE URINE, QUANTITATIVE: NEGATIVE
OPIATES, URINE: NEGATIVE
OSMOLALITY URINE: 591 MOS/L
OXYCODONE: NEGATIVE
PDW BLD-RTO: 14.1 % (ref 11.7–14.9)
PH, URINE: 6 (ref 5–8)
PHENCYCLIDINE, URINE: NEGATIVE
PLATELET # BLD: 120 K/CU MM (ref 140–440)
PLT MORPHOLOGY: ABNORMAL
PMV BLD AUTO: 10.6 FL (ref 7.5–11.1)
POTASSIUM SERPL-SCNC: 3.3 MMOL/L (ref 3.5–5.1)
POTASSIUM SERPL-SCNC: 3.5 MMOL/L (ref 3.5–5.1)
POTASSIUM SERPL-SCNC: 3.6 MMOL/L (ref 3.5–5.1)
POTASSIUM SERPL-SCNC: 3.7 MMOL/L (ref 3.5–5.1)
POTASSIUM, UR: 41 MMOL/L (ref 22–119)
PROTEIN UA: NEGATIVE MG/DL
PROTHROMBIN TIME: 9.2 SECONDS (ref 11.7–14.5)
RBC # BLD: 4.23 M/CU MM (ref 4.2–5.4)
RBC # BLD: ABNORMAL 10*6/UL
RBC URINE: <1 /HPF (ref 0–6)
SEGMENTED NEUTROPHILS ABSOLUTE COUNT: 8.3 K/CU MM
SEGMENTED NEUTROPHILS RELATIVE PERCENT: 64 % (ref 36–66)
SODIUM BLD-SCNC: 127 MMOL/L (ref 135–145)
SODIUM BLD-SCNC: 128 MMOL/L (ref 135–145)
SODIUM BLD-SCNC: 130 MMOL/L (ref 135–145)
SODIUM BLD-SCNC: 133 MMOL/L (ref 135–145)
SODIUM BLD-SCNC: 135 MMOL/L (ref 135–145)
SODIUM BLD-SCNC: 136 MMOL/L (ref 135–145)
SODIUM URINE: 96 MMOL/L (ref 35–167)
SPECIFIC GRAVITY UA: 1.01 (ref 1–1.03)
SQUAMOUS EPITHELIAL: 1 /HPF
TOTAL PROTEIN: 5.9 GM/DL (ref 6.4–8.2)
TRICHOMONAS: ABNORMAL /HPF
TROPONIN T: <0.01 NG/ML
UROBILINOGEN, URINE: NEGATIVE MG/DL (ref 0.2–1)
VITAMIN D 25-HYDROXY: 7.05 NG/ML
WBC # BLD: 13.1 K/CU MM (ref 4–10.5)
WBC UA: <1 /HPF (ref 0–5)

## 2021-09-08 PROCEDURE — 99254 IP/OBS CNSLTJ NEW/EST MOD 60: CPT | Performed by: INTERNAL MEDICINE

## 2021-09-08 PROCEDURE — 81001 URINALYSIS AUTO W/SCOPE: CPT

## 2021-09-08 PROCEDURE — 2580000003 HC RX 258: Performed by: INTERNAL MEDICINE

## 2021-09-08 PROCEDURE — 84133 ASSAY OF URINE POTASSIUM: CPT

## 2021-09-08 PROCEDURE — 1200000000 HC SEMI PRIVATE

## 2021-09-08 PROCEDURE — 6360000002 HC RX W HCPCS: Performed by: INTERNAL MEDICINE

## 2021-09-08 PROCEDURE — 85384 FIBRINOGEN ACTIVITY: CPT

## 2021-09-08 PROCEDURE — 82728 ASSAY OF FERRITIN: CPT

## 2021-09-08 PROCEDURE — 2060000000 HC ICU INTERMEDIATE R&B

## 2021-09-08 PROCEDURE — 85027 COMPLETE CBC AUTOMATED: CPT

## 2021-09-08 PROCEDURE — 85610 PROTHROMBIN TIME: CPT

## 2021-09-08 PROCEDURE — 83605 ASSAY OF LACTIC ACID: CPT

## 2021-09-08 PROCEDURE — 36415 COLL VENOUS BLD VENIPUNCTURE: CPT

## 2021-09-08 PROCEDURE — 85379 FIBRIN DEGRADATION QUANT: CPT

## 2021-09-08 PROCEDURE — 84484 ASSAY OF TROPONIN QUANT: CPT

## 2021-09-08 PROCEDURE — 86141 C-REACTIVE PROTEIN HS: CPT

## 2021-09-08 PROCEDURE — 84300 ASSAY OF URINE SODIUM: CPT

## 2021-09-08 PROCEDURE — 80307 DRUG TEST PRSMV CHEM ANLYZR: CPT

## 2021-09-08 PROCEDURE — 80048 BASIC METABOLIC PNL TOTAL CA: CPT

## 2021-09-08 PROCEDURE — 80053 COMPREHEN METABOLIC PANEL: CPT

## 2021-09-08 PROCEDURE — 93308 TTE F-UP OR LMTD: CPT

## 2021-09-08 PROCEDURE — 82570 ASSAY OF URINE CREATININE: CPT

## 2021-09-08 PROCEDURE — 83935 ASSAY OF URINE OSMOLALITY: CPT

## 2021-09-08 PROCEDURE — 82436 ASSAY OF URINE CHLORIDE: CPT

## 2021-09-08 PROCEDURE — 94761 N-INVAS EAR/PLS OXIMETRY MLT: CPT

## 2021-09-08 PROCEDURE — 99222 1ST HOSP IP/OBS MODERATE 55: CPT | Performed by: INTERNAL MEDICINE

## 2021-09-08 PROCEDURE — 85007 BL SMEAR W/DIFF WBC COUNT: CPT

## 2021-09-08 PROCEDURE — 6370000000 HC RX 637 (ALT 250 FOR IP): Performed by: INTERNAL MEDICINE

## 2021-09-08 PROCEDURE — 2700000000 HC OXYGEN THERAPY PER DAY

## 2021-09-08 PROCEDURE — 83615 LACTATE (LD) (LDH) ENZYME: CPT

## 2021-09-08 PROCEDURE — 82306 VITAMIN D 25 HYDROXY: CPT

## 2021-09-08 PROCEDURE — 85730 THROMBOPLASTIN TIME PARTIAL: CPT

## 2021-09-08 PROCEDURE — 87449 NOS EACH ORGANISM AG IA: CPT

## 2021-09-08 RX ORDER — SODIUM CHLORIDE 9 MG/ML
INJECTION, SOLUTION INTRAVENOUS CONTINUOUS
Status: DISCONTINUED | OUTPATIENT
Start: 2021-09-08 | End: 2021-09-10

## 2021-09-08 RX ORDER — ALBUTEROL SULFATE 90 UG/1
2 AEROSOL, METERED RESPIRATORY (INHALATION) EVERY 6 HOURS PRN
Status: DISCONTINUED | OUTPATIENT
Start: 2021-09-08 | End: 2021-09-09

## 2021-09-08 RX ORDER — CARVEDILOL 3.12 MG/1
3.12 TABLET ORAL 2 TIMES DAILY WITH MEALS
Status: DISCONTINUED | OUTPATIENT
Start: 2021-09-08 | End: 2021-09-09

## 2021-09-08 RX ORDER — POTASSIUM CHLORIDE 20 MEQ/1
40 TABLET, EXTENDED RELEASE ORAL ONCE
Status: COMPLETED | OUTPATIENT
Start: 2021-09-08 | End: 2021-09-08

## 2021-09-08 RX ORDER — DEXTROSE MONOHYDRATE 50 MG/ML
INJECTION, SOLUTION INTRAVENOUS CONTINUOUS
Status: DISCONTINUED | OUTPATIENT
Start: 2021-09-08 | End: 2021-09-08

## 2021-09-08 RX ADMIN — AZITHROMYCIN MONOHYDRATE 500 MG: 500 INJECTION, POWDER, LYOPHILIZED, FOR SOLUTION INTRAVENOUS at 10:41

## 2021-09-08 RX ADMIN — DEXAMETHASONE 6 MG: 4 TABLET ORAL at 09:42

## 2021-09-08 RX ADMIN — ENOXAPARIN SODIUM 30 MG: 30 INJECTION SUBCUTANEOUS at 20:37

## 2021-09-08 RX ADMIN — DEXTROSE MONOHYDRATE: 5 INJECTION INTRAVENOUS at 09:40

## 2021-09-08 RX ADMIN — CARVEDILOL 3.12 MG: 3.12 TABLET, FILM COATED ORAL at 16:06

## 2021-09-08 RX ADMIN — ENOXAPARIN SODIUM 30 MG: 30 INJECTION SUBCUTANEOUS at 09:41

## 2021-09-08 RX ADMIN — CEFTRIAXONE 1000 MG: 1 INJECTION, POWDER, FOR SOLUTION INTRAMUSCULAR; INTRAVENOUS at 09:41

## 2021-09-08 RX ADMIN — POTASSIUM CHLORIDE 40 MEQ: 1500 TABLET, EXTENDED RELEASE ORAL at 16:06

## 2021-09-08 RX ADMIN — SODIUM CHLORIDE: 9 INJECTION, SOLUTION INTRAVENOUS at 16:05

## 2021-09-08 RX ADMIN — SODIUM CHLORIDE: 9 INJECTION, SOLUTION INTRAVENOUS at 04:25

## 2021-09-08 NOTE — PROGRESS NOTES
Hospitalist Progress Note      Name:  Ronel Hunt /Age/Sex: 1965  (63 y.o. female)   MRN & CSN:  [id number] & [id number] Admission Date/Time: 2021  3:26 AM   Location:  -A PCP: No primary care provider on file. Hospital Day: 2    Assessment and Plan:   62 y/o F that presented with syncope and SOB found to have COVID 19    Syncope  - No episodes since admission  - Echo ordered  - Cardiology following, appreciate recs; Holter and Stress once COVID medically resolved  - Tele    SOB  COVID Pneumonia  - ON 2L Oxygen thism AM  - Continue Decadron  - Will continue abx; procal/CRP elevated, will trend  - Spoke to patient regarding Remdesivir and she declined for now    Hyponatremia  - On admit 120  - Overcorrected overnight so started on D5W  - Nephrology following, appreciate recs    History of Alcoholism  - CIWA  - Thiamine  - Monitor closely for withdrawal symptoms     Hx COPD  - Albuterol inhaler PRN    Diet ADULT DIET; Regular   DVT Prophylaxis [] Lovenox, []  Heparin, [] SCDs, [] Ambulation   GI Prophylaxis [] PPI,  [] H2 Blocker,  [] Carafate,  [] Diet/Tube Feeds   Code Status Full Code   Disposition Patient requires continued admission due to    MDM [] Low, [] Moderate,[]  High  Patient's risk as above due to      History of Present Illness:     States she is doing well; denies any significant SOB; no withdrawal symptoms; no chills overnight, no chest pain    Objective: Intake/Output Summary (Last 24 hours) at 2021 1338  Last data filed at 2021 1130  Gross per 24 hour   Intake 240 ml   Output 350 ml   Net -110 ml      Vitals:   Vitals:    21 1130   BP:    Pulse: 93   Resp: 23   Temp:    SpO2: 97%     Physical Exam:   GEN Awake female, sitting upright in bed in no apparent distress. Appears given age. EYES Pupils are equally round. No scleral erythema, discharge, or conjunctivitis.   HENT Mucous membranes are moist  NECK Supple, no apparent thyromegaly or masses. RESP Clear to auscultation, no wheezes, rales or rhonchi. Symmetric chest movement while on 2L NC  CARDIO/VASC S1/S2 auscultated. Regular rate without appreciable murmurs, rubs, or gallops. No JVD or carotid bruits. Peripheral pulses equal bilaterally and palpable. No peripheral edema. GI Abdomen is soft without significant tenderness, masses, or guarding   No costovertebral angle tenderness  HEME/LYMPH No petechiae or ecchymoses. MSK No gross joint deformities. SKIN Normal coloration, warm, dry. NEURO Cranial nerves appear grossly intact, normal speech  PSYCH Awake, alert, oriented x 4. Affect appropriate.     Medications:   Medications:    cefTRIAXone (ROCEPHIN) IV  1,000 mg IntraVENous Q24H    azithromycin  500 mg IntraVENous Q24H    sodium chloride flush  5-40 mL IntraVENous 2 times per day    enoxaparin  30 mg SubCUTAneous BID    dexamethasone  6 mg Oral Daily    sodium chloride flush  5-40 mL IntraVENous 2 times per day      Infusions:    dextrose 75 mL/hr at 09/08/21 0940    sodium chloride Stopped (09/08/21 0937)    sodium chloride       PRN Meds: sodium chloride flush, 5-40 mL, PRN  sodium chloride, 25 mL, PRN  ondansetron, 4 mg, Q8H PRN   Or  ondansetron, 4 mg, Q6H PRN  polyethylene glycol, 17 g, Daily PRN  acetaminophen, 650 mg, Q6H PRN   Or  acetaminophen, 650 mg, Q6H PRN  sodium chloride flush, 5-40 mL, PRN  sodium chloride, 25 mL, PRN  LORazepam, 1 mg, Q1H PRN   Or  LORazepam, 1 mg, Q1H PRN   Or  LORazepam, 2 mg, Q1H PRN   Or  LORazepam, 2 mg, Q1H PRN   Or  LORazepam, 3 mg, Q1H PRN   Or  LORazepam, 3 mg, Q1H PRN   Or  LORazepam, 4 mg, Q1H PRN   Or  LORazepam, 4 mg, Q1H PRN          Electronically signed by Tricia Ramos MD on 9/8/2021 at 1:38 PM

## 2021-09-08 NOTE — CONSULTS
hearing loss, tinnitus, ear drainage, sinus pressure, nasal congestion, epistaxis and snoring  RESPIRATORY:  See HPI  CARDIOVASCULAR:  negative for chest pain, palpitations, exertional chest pressure/discomfort, edema, syncope  GASTROINTESTINAL:  negative for nausea, vomiting, diarrhea, constipation, blood in stool and abdominal pain  GENITOURINARY:  negative for frequency, dysuria, urinary incontinence, decreased urine volume, and hematuria  HEMATOLOGIC/LYMPHATIC:  negative for easy bruising, bleeding and lymphadenopathy  ALLERGIC/IMMUNOLOGIC:  negative for recurrent infections, angioedema, anaphylaxis and drug reactions  ENDOCRINE:  negative for weight changes and diabetic symptoms including polyuria, polydipsia and polyphagia  MUSCULOSKELETAL:  negative for  pain, joint swelling, decreased range of motion and muscle weakness  NEUROLOGICAL:  negative for headaches, slurred speech, unilateral weakness  PSYCHIATRIC/BEHAVIORAL: negative for hallucinations, behavioral problems, confusion and agitation. Objective:   PHYSICAL EXAM:      VITALS:  BP (!) 141/90   Pulse 92   Temp 98.7 °F (37.1 °C) (Oral)   Resp 18   Wt 131 lb 9.8 oz (59.7 kg)   SpO2 98%   BMI 22.59 kg/m²   24HR INTAKE/OUTPUT:      Intake/Output Summary (Last 24 hours) at 2021 1203  Last data filed at 2021 1820  Gross per 24 hour   Intake 240 ml   Output --   Net 240 ml     CURRENT PULSE OXIMETRY:  SpO2: 98 %  24HR PULSE OXIMETRY RANGE:  SpO2  Av.6 %  Min: 92 %  Max: 98 %    CONSTITUTIONAL:  awake, alert, cooperative, no apparent distress, and appears stated age  NECK:  Supple, symmetrical, trachea midline, no adenopathy, thyroid symmetric, not enlarged and no tenderness, skin normal  LUNGS: Occasional basal crackles  CARDIOVASCULAR:  normal S1 and S2, no edema and no JVD  ABDOMEN:  normal bowel sounds, non-distended and no masses palpated, and no tenderness to palpation.  No hepatospleenomegaly  LYMPHADENOPATHY:  no axillary or supraclavicular adenopathy. No cervical adnenopathy  PSYCHIATRIC: Oriented to person place and time. No obvious depression or anxiety. MUSCULOSKELETAL: No obvious misalignment or effusion of the joints. No clubbing, cyanosis of the digits. SKIN:  normal skin color, texture, turgor and no redness, warmth, or swelling. No palpable nodules    DATA:    Old records have been reviewed  CBC with Differential:    Lab Results   Component Value Date    WBC 13.1 09/08/2021    RBC 4.23 09/08/2021    HGB 14.6 09/08/2021    HCT 43.5 09/08/2021     09/08/2021    .8 09/08/2021    MCH 34.5 09/08/2021    MCHC 33.6 09/08/2021    RDW 14.1 09/08/2021    SEGSPCT 64.0 09/08/2021    BANDSPCT 29 09/08/2021    LYMPHOPCT 2.0 09/08/2021    MONOPCT 5.0 09/08/2021    BASOPCT 0.2 09/07/2021    MONOSABS 0.7 09/08/2021    LYMPHSABS 0.3 09/08/2021    EOSABS 0.0 09/07/2021    BASOSABS 0.0 09/07/2021    DIFFTYPE MANUAL DIFFERENTIAL 09/08/2021     BMP:    Lab Results   Component Value Date     09/08/2021     09/08/2021    K 3.6 09/08/2021    K 3.6 09/08/2021    CL 96 09/08/2021    CL 99 09/08/2021    CO2 22 09/08/2021    CO2 22 09/08/2021    BUN 7 09/08/2021    BUN 7 09/08/2021    CREATININE 0.3 09/08/2021    CREATININE 0.3 09/08/2021    CALCIUM 8.6 09/08/2021    CALCIUM 8.3 09/08/2021    GFRAA >60 09/08/2021    GFRAA >60 09/08/2021    LABGLOM >60 09/08/2021    LABGLOM >60 09/08/2021    GLUCOSE 105 09/08/2021    GLUCOSE 110 09/08/2021     Hepatic Function Panel:    Lab Results   Component Value Date    ALKPHOS 83 09/08/2021     09/08/2021     09/08/2021    PROT 5.9 09/08/2021    BILITOT 0.3 09/08/2021     ABG:  No results found for: SGY8VFT, BEART, K9ETRDJS, PHART, THGBART, LLF8QJR, PO2ART, HRV5WOG    Cultures:   Pending    Radiology Review:    Middle lobe atelectasis or pneumonia  No evidence of acute intracranial trauma. 2. Moderate cerebral white matter chronic microvascular ischemic disease.    3. No evidence of acute cervical spine abnormality. 4. Note: Evaluation of cervical spine limited by patient motion related   artifact. Assessment/Plan       Patient Active Problem List    Diagnosis Date Noted    COVID-19 09/07/2021   RML Pneumonia suspected to be sec to COVID -19  Hypoxia  Alcohol abuse  ? Syncope      PLAN  1. Abx  2. F/u C&S  3. Inhalers  4. Decadron  5. CXR in am  6. Keep sats > 92%  7. Thiamine   8. Folic acid  9. MVT  10. Watch for the DT's  11.  C/w  Present management          Electronically signed by Leonardo Coleman MD on 9/8/2021 at 12:03 PM

## 2021-09-08 NOTE — PROGRESS NOTES
Physician Progress Note      Carol Schuster  [facility] #:                  [id number]  :                       1965  ADMIT DATE:       2021 3:26 AM  DISCH DATE:  RESPONDING  PROVIDER #:        Don Capps MD          QUERY TEXT:    Hospitalists,    Pt admitted with COVID-19 and noted to have SIRS. If possible, please   document in progress notes and discharge summary if you are evaluating and/or   treating: The medical record reflects the following:  Risk Factors: COVID PNA  Clinical Indicators: RR 18-27, HR , bands 29, WBC 8.2-13.1,   procalcitonin 2.05, ,  Treatment: serial labs, imaging, IV Zithromax & Rocephin,    Thank you,  Rene Petersen RN CDS  [phone]  Options provided:  -- Sepsis present on admission due to COVID-19 infection  -- Sepsis not present on admission due to COVID-19 infection  -- Sepsis present on admission due to COVID-19 pneumonia  -- Sepsis not present on admission due to COVID-19 pneumonia  -- Covid-19 infection without sepsis  -- Covid-19 pneumonia without sepsis  -- Other - I will add my own diagnosis  -- Disagree - Not applicable / Not valid  -- Disagree - Clinically unable to determine / Unknown  -- Refer to Clinical Documentation Reviewer    PROVIDER RESPONSE TEXT:    This patient has sepsis which was present on admission due to COVID-19   infection.     Query created by: Krys Hughes on 2021 2:48 PM      Electronically signed by:  Don Capps MD 2021 4:01 PM

## 2021-09-08 NOTE — PROGRESS NOTES
Pt seen and examined and full note to follow. Hx etoh use and affect better but anxious. Adjust ivf as not want rapid na correction.

## 2021-09-08 NOTE — CONSULTS
INPATIENT CARDIOLOGY CONSULT NOTE       Reason for consultation:  Syncope     Referring physician:  Brett Ellison MD     Primary care physician: No primary care provider on file. Dear Brett Ellison MD Thank you for the consult    Chief Complaint   Patient presents with    Shortness of Breath    Loss of Consciousness    Alcohol Intoxication    Fall       History of present illness:Hermila is a 63 y. o.year old who  presents with  Chief Complaint   Patient presents with    Shortness of Breath    Loss of Consciousness    Alcohol Intoxication    Fall       Patient is a 76-year-old female who is presenting to the hospital after of syncopal episode at home at 2 in the morning. Patient is currently admitted in COVID-19 isolation unit as she was noted to have fever with Covid positive test on arrival.    Patient mentions, last night when she went to the restroom at 2 in the morning she felt dizzy and fell down. She mentions she lost consciousness but does not recall the whole incidence. Patient also mentions she is an alcoholic and drinks 12 packs/day. She denies any shortness of breath or chest pain or palpitations before or after the fall. Patient mention her  noted her to have shaky hands at the time of syncope. No prior history of seizure disorder    Telemetry shows sinus rhythm    Patient smokes 1 pack/day cigarettes  12 packs of beer per day  Denies any drug abuse       Past medical history:    has a past medical history of COVID-19 and Hypertension. Past surgical history:   has a past surgical history that includes Dilation and curettage of uterus and Cholecystectomy. Social History:   reports that she has been smoking cigarettes. She has been smoking about 1.00 pack per day. She has never used smokeless tobacco. She reports current alcohol use of about 12.0 standard drinks of alcohol per week. She reports that she does not use drugs.   Family history:   no family history of CAD, MD        0.9 % sodium chloride infusion  25 mL IntraVENous PRN Anila Rico MD   Stopped at 09/08/21 0937    enoxaparin (LOVENOX) injection 30 mg  30 mg SubCUTAneous BID Avinash Dominic Escalera MD   30 mg at 09/08/21 0941    ondansetron (ZOFRAN-ODT) disintegrating tablet 4 mg  4 mg Oral Q8H PRN Avinash Dominic Escalera MD        Or    ondansetron (ZOFRAN) injection 4 mg  4 mg IntraVENous Q6H PRN Avinash Dominic Escalera MD        polyethylene glycol (GLYCOLAX) packet 17 g  17 g Oral Daily PRN Avinash Dominic Escalera MD        acetaminophen (TYLENOL) tablet 650 mg  650 mg Oral Q6H PRN Avinash Dominic Escalera MD        Or    acetaminophen (TYLENOL) suppository 650 mg  650 mg Rectal Q6H PRN Avinash Dominic Escalera MD        dexamethasone (DECADRON) tablet 6 mg  6 mg Oral Daily Sandy CARRASCO MD   6 mg at 09/08/21 0942    sodium chloride flush 0.9 % injection 5-40 mL  5-40 mL IntraVENous 2 times per day Anila Rico MD        sodium chloride flush 0.9 % injection 5-40 mL  5-40 mL IntraVENous PRN Avinash Dominic Escalera MD        0.9 % sodium chloride infusion  25 mL IntraVENous PRN Avinash Dominic Escalera MD        LORazepam (ATIVAN) tablet 1 mg  1 mg Oral Q1H PRN Avinash Dominic Escalera MD        Or    LORazepam (ATIVAN) injection 1 mg  1 mg IntraVENous Q1H PRN Avinash Dominic Escalera MD        Or    LORazepam (ATIVAN) tablet 2 mg  2 mg Oral Q1H PRN Avinash Dominic Escalera MD        Or    LORazepam (ATIVAN) injection 2 mg  2 mg IntraVENous Q1H PRN Avinash Dominic Escalera MD        Or    LORazepam (ATIVAN) tablet 3 mg  3 mg Oral Q1H PRN Avinash Dominic Escalera MD        Or    LORazepam (ATIVAN) injection 3 mg  3 mg IntraVENous Q1H PRN Avinash Dominic Escalera MD        Or    LORazepam (ATIVAN) tablet 4 mg  4 mg Oral Q1H PRN Avinash Dominic Escalera MD        Or    LORazepam (ATIVAN) injection 4 mg  4 mg IntraVENous Q1H PRN Avinash Escalera MD             Review of Systems:     · Constitutional: No Fever or Weight Loss   · Eyes: No Decreased Vision  · ENT: No Headaches, Hearing Loss or Vertigo  · Cardiovascular: No chest pain, dyspnea on exertion, palpitations or loss of consciousness  · Respiratory: No cough or wheezing    · Gastrointestinal: No abdominal pain, appetite loss, blood in stools, constipation, diarrhea or heartburn  · Genitourinary: No dysuria, trouble voiding, or hematuria  · Musculoskeletal:  No gait disturbance, weakness or joint complaints  · Integumentary: No rash or pruritis  · Neurological: No TIA or stroke symptoms  · Psychiatric: No anxiety or depression  · Endocrine: No malaise, fatigue or temperature intolerance  · Hematologic/Lymphatic: No bleeding problems, blood clots or swollen lymph nodes  · Allergic/Immunologic: No nasal congestion or hives    All other systems were reviewed and were negative otherwise. Physical Examination:      Vitals:    09/08/21 1130   BP:    Pulse: 93   Resp: 23   Temp:    SpO2: 97%      Wt Readings from Last 3 Encounters:   09/08/21 131 lb 9.8 oz (59.7 kg)   09/18/19 130 lb (59 kg)   11/26/17 120 lb (54.4 kg)     Body mass index is 22.59 kg/m². General Appearance:  No distress, conversant  Constitutional:  Well developed, Well nourished  HEENT:  Normocephalic, Atraumatic, Oropharynx moist, No oral exudates,   Nose normal. Neck Supple Carotid: no carotid bruit  Eyes:  Conjunctiva normal, No discharge. Respiratory:    Normal breath sounds, No respiratory distress, No wheezing, no use of accessory muscles, diaphragm movement is normal  No chest Tenderness  Cardiovascular: S1-S2 No murmurs auscultated. No rubs, thrills or gallops. Normal  rhythm. Pedal pulses are normal. Nopedal edema  GI:  Soft Non tender, non distended. :  No CVA tenderness. Musculoskeletal:   No tenderness, No cyanosis, No clubbing. Integument:  Warm, Dry, No erythema, No rash. Lymphatic:  No lymphadenopathy noted. Neurologic:  Alert & oriented x 3  No focal deficits noted.    Psychiatric:  Affect normal, Judgment normal, Mood normal.       Lab Review     Recent Labs 09/08/21  0745   WBC 13.1*   HGB 14.6   HCT 43.5   *      Recent Labs     09/08/21  0745   *  136   K 3.6  3.6   CL 96*  99   CO2 22  22   BUN 7  7   CREATININE 0.3*  0.3*     Recent Labs     09/08/21  0745   *   *   BILITOT 0.3   ALKPHOS 83     No results for input(s): TROPONINI in the last 72 hours. No results found for: BNP  Lab Results   Component Value Date    INR 0.72 09/08/2021    PROTIME 9.2 (L) 09/08/2021         All labs, images, EKGs were personally reviewed      Assessment: 63 y. o.year old with PMH of  has a past medical history of COVID-19 and Hypertension. Recommendations:      1. Syncope  From history likely vasovagal in the setting of viral infection  Possible alcohol intoxication  Obtain echocardiogram  We will further evaluate as outpatient including Holter monitor and stress MPI once COVID-19 has resolved  ? Seizures    2. Shortness of breath and fever likely secondary to COVID-19: Patient is on IV antibiotics and steroids. 3. Nicotine dependence and COPD: In exacerbation. On IV steroids and breathing treatments  4. Hyponatremia secondary to alcohol use  5. DVT prophylaxis: Lovenox.       Thank you for the consult    Dr. Isabell Hernandez  9/8/2021 12:18 PM

## 2021-09-08 NOTE — CONSULTS
used smokeless tobacco.  ETOH:   reports current alcohol use of about 12.0 standard drinks of alcohol per week. OCCUPATION:      Family History:   History reviewed. No pertinent family history. REVIEW OF SYSTEMS:  Negative except for weak anxious. Physical Exam:    I/O: 09/07 0701 - 09/08 0700  In: 480 [P.O.:480]  Out: -     Vitals: BP (!) 141/91   Pulse 84   Temp 98.7 °F (37.1 °C) (Oral)   Resp 28   Wt 131 lb 9.8 oz (59.7 kg)   SpO2 97%   BMI 22.59 kg/m²   General appearance: awake weak  HEENT: Head: Normal, normocephalic, atraumatic. Neck: supple, symmetrical, trachea midline  Lungs: diminished breath sounds bilaterally  Heart: S1, S2 normal  Abdomen: abnormal findings:  soft NT  Extremities: edema trace  Neurologic: Mental status: alertness: alert      CBC:   Recent Labs     09/07/21 0342 09/08/21  0745   WBC 8.2 13.1*   HGB 15.7 14.6   * 120*     BMP:    Recent Labs     09/08/21  0227 09/08/21  0745 09/08/21  1255    133*  136 130*   K 3.5 3.6  3.6 3.3*   CL 98* 96*  99 92*   CO2 23 22  22 25   BUN 7 7  7 7   CREATININE 0.3* 0.3*  0.3* 0.4*   GLUCOSE 117* 105*  110* 245*     Hepatic:   Recent Labs     09/07/21  0342 09/08/21  0745   * 116*   * 104*   BILITOT 0.4 0.3   ALKPHOS 99 83     Troponin: No results for input(s): TROPONINI in the last 72 hours. BNP: No results for input(s): BNP in the last 72 hours. Lipids: No results for input(s): CHOL, HDL in the last 72 hours.     Invalid input(s): LDLCALCU  ABGs: No results found for: PHART, PO2ART, XHH0HOG  INR:   Recent Labs     09/07/21 0342 09/08/21  0745   INR 0.82 0.72     Renal Labs  Albumin:    Lab Results   Component Value Date    LABALBU 3.3 09/08/2021     Calcium:    Lab Results   Component Value Date    CALCIUM 8.8 09/08/2021     Phosphorus:  No results found for: PHOS  U/A:    Lab Results   Component Value Date    NITRU NEGATIVE 09/08/2021    COLORU YELLOW 09/08/2021    WBCUA <1 09/08/2021    RBCUA <1 09/08/2021    MUCUS RARE 09/08/2021    TRICHOMONAS NONE SEEN 09/08/2021    BACTERIA NEGATIVE 09/08/2021    CLARITYU CLEAR 09/08/2021    SPECGRAV 1.014 09/08/2021    UROBILINOGEN NEGATIVE 09/08/2021    BILIRUBINUR NEGATIVE 09/08/2021    BLOODU SMALL 09/08/2021    KETUA LARGE 09/08/2021     ABG:  No results found for: PHART, WYP7DDN, PO2ART, XJR3DII, BEART, THGBART, RST0NPU, M1ZZXHGE  HgBA1c:  No results found for: LABA1C  Microalbumen/Creatinine ratio:  No components found for: RUCREAT  TSH:  No results found for: TSH  IRON:  No results found for: IRON  Iron Saturation:  No components found for: PERCENTFE  TIBC:  No results found for: TIBC  FERRITIN:    Lab Results   Component Value Date    FERRITIN 4,178 09/08/2021     RPR:  No results found for: RPR  JANNETH:  No results found for: ANATITER, JANNETH  24 Hour Urine for Creatinine Clearance:  No components found for: CREAT4, UHRS10, UTV10  -----------------------------------------------------------------      Assessment and Recommendations     Patient Active Problem List   Diagnosis Code    COVID-19 U07.1    Hyponatremia E87.1     Impression plan  1. COVID-19 positive  2. Hyponatremia  3. Hypertension  4. Hypokalemia  5. Anxiety  6. Alcohol use abuse    Plan next   #1 maintain protocols for COVID-19  2. Sodium is dropped down to 130 can change back to normal saline IV fluids  3.   Pressure increased will add blood pressure meds next #4 replete potassium next #5 monitor anxiety next #6 monitor for withdrawal from alcohol  Electronically signed by Pamela Faulkner MD on 9/8/2021 at 3:45 PM

## 2021-09-08 NOTE — PROGRESS NOTES
Chart reviewd seeing  today. Na came up with ns ivf and not want rapid increase,. Change ivf to d5w and will monitor na.  Full note to follow

## 2021-09-09 ENCOUNTER — APPOINTMENT (OUTPATIENT)
Dept: GENERAL RADIOLOGY | Age: 56
DRG: 871 | End: 2021-09-09
Payer: COMMERCIAL

## 2021-09-09 LAB
ANION GAP SERPL CALCULATED.3IONS-SCNC: 11 MMOL/L (ref 4–16)
ANION GAP SERPL CALCULATED.3IONS-SCNC: 13 MMOL/L (ref 4–16)
BUN BLDV-MCNC: 5 MG/DL (ref 6–23)
BUN BLDV-MCNC: 6 MG/DL (ref 6–23)
CALCIUM SERPL-MCNC: 8.6 MG/DL (ref 8.3–10.6)
CALCIUM SERPL-MCNC: 8.9 MG/DL (ref 8.3–10.6)
CHLORIDE BLD-SCNC: 93 MMOL/L (ref 99–110)
CHLORIDE BLD-SCNC: 93 MMOL/L (ref 99–110)
CO2: 26 MMOL/L (ref 21–32)
CO2: 26 MMOL/L (ref 21–32)
CREAT SERPL-MCNC: 0.3 MG/DL (ref 0.6–1.1)
CREAT SERPL-MCNC: 0.4 MG/DL (ref 0.6–1.1)
D DIMER: 377 NG/ML(DDU)
GFR AFRICAN AMERICAN: >60 ML/MIN/1.73M2
GFR AFRICAN AMERICAN: >60 ML/MIN/1.73M2
GFR NON-AFRICAN AMERICAN: >60 ML/MIN/1.73M2
GFR NON-AFRICAN AMERICAN: >60 ML/MIN/1.73M2
GLUCOSE BLD-MCNC: 122 MG/DL (ref 70–99)
GLUCOSE BLD-MCNC: 162 MG/DL (ref 70–99)
HIGH SENSITIVE C-REACTIVE PROTEIN: 93.9 MG/L
POTASSIUM SERPL-SCNC: 3.5 MMOL/L (ref 3.5–5.1)
POTASSIUM SERPL-SCNC: 3.7 MMOL/L (ref 3.5–5.1)
PROCALCITONIN: 0.92
SODIUM BLD-SCNC: 130 MMOL/L (ref 135–145)
SODIUM BLD-SCNC: 132 MMOL/L (ref 135–145)

## 2021-09-09 PROCEDURE — 6360000002 HC RX W HCPCS: Performed by: INTERNAL MEDICINE

## 2021-09-09 PROCEDURE — 1200000000 HC SEMI PRIVATE

## 2021-09-09 PROCEDURE — 2580000003 HC RX 258: Performed by: INTERNAL MEDICINE

## 2021-09-09 PROCEDURE — 6370000000 HC RX 637 (ALT 250 FOR IP): Performed by: INTERNAL MEDICINE

## 2021-09-09 PROCEDURE — APPSS60 APP SPLIT SHARED TIME 46-60 MINUTES: Performed by: NURSE PRACTITIONER

## 2021-09-09 PROCEDURE — 6360000002 HC RX W HCPCS: Performed by: NURSE PRACTITIONER

## 2021-09-09 PROCEDURE — 94640 AIRWAY INHALATION TREATMENT: CPT

## 2021-09-09 PROCEDURE — 80048 BASIC METABOLIC PNL TOTAL CA: CPT

## 2021-09-09 PROCEDURE — 99232 SBSQ HOSP IP/OBS MODERATE 35: CPT | Performed by: INTERNAL MEDICINE

## 2021-09-09 PROCEDURE — 71045 X-RAY EXAM CHEST 1 VIEW: CPT

## 2021-09-09 PROCEDURE — 85379 FIBRIN DEGRADATION QUANT: CPT

## 2021-09-09 PROCEDURE — 2700000000 HC OXYGEN THERAPY PER DAY

## 2021-09-09 PROCEDURE — 84145 PROCALCITONIN (PCT): CPT

## 2021-09-09 PROCEDURE — 36415 COLL VENOUS BLD VENIPUNCTURE: CPT

## 2021-09-09 PROCEDURE — 86141 C-REACTIVE PROTEIN HS: CPT

## 2021-09-09 PROCEDURE — 2580000003 HC RX 258: Performed by: NURSE PRACTITIONER

## 2021-09-09 PROCEDURE — 94761 N-INVAS EAR/PLS OXIMETRY MLT: CPT

## 2021-09-09 RX ORDER — GUAIFENESIN/DEXTROMETHORPHAN 100-10MG/5
5 SYRUP ORAL EVERY 4 HOURS PRN
Status: DISCONTINUED | OUTPATIENT
Start: 2021-09-09 | End: 2021-09-10 | Stop reason: HOSPADM

## 2021-09-09 RX ORDER — ALBUTEROL SULFATE 90 UG/1
2 AEROSOL, METERED RESPIRATORY (INHALATION)
Status: DISCONTINUED | OUTPATIENT
Start: 2021-09-09 | End: 2021-09-10 | Stop reason: HOSPADM

## 2021-09-09 RX ORDER — CARVEDILOL 6.25 MG/1
6.25 TABLET ORAL 2 TIMES DAILY WITH MEALS
Status: DISCONTINUED | OUTPATIENT
Start: 2021-09-09 | End: 2021-09-10 | Stop reason: HOSPADM

## 2021-09-09 RX ORDER — ALBUTEROL SULFATE 90 UG/1
2 AEROSOL, METERED RESPIRATORY (INHALATION)
Status: DISCONTINUED | OUTPATIENT
Start: 2021-09-09 | End: 2021-09-09

## 2021-09-09 RX ORDER — CLONIDINE HYDROCHLORIDE 0.1 MG/1
0.1 TABLET ORAL ONCE
Status: COMPLETED | OUTPATIENT
Start: 2021-09-09 | End: 2021-09-09

## 2021-09-09 RX ORDER — GUAIFENESIN 600 MG/1
600 TABLET, EXTENDED RELEASE ORAL 2 TIMES DAILY
Status: DISCONTINUED | OUTPATIENT
Start: 2021-09-09 | End: 2021-09-10 | Stop reason: HOSPADM

## 2021-09-09 RX ADMIN — SODIUM CHLORIDE: 9 INJECTION, SOLUTION INTRAVENOUS at 21:47

## 2021-09-09 RX ADMIN — GUAIFENESIN 600 MG: 600 TABLET, EXTENDED RELEASE ORAL at 10:35

## 2021-09-09 RX ADMIN — ENOXAPARIN SODIUM 30 MG: 30 INJECTION SUBCUTANEOUS at 08:46

## 2021-09-09 RX ADMIN — ENOXAPARIN SODIUM 30 MG: 30 INJECTION SUBCUTANEOUS at 20:03

## 2021-09-09 RX ADMIN — Medication 10 ML: at 08:45

## 2021-09-09 RX ADMIN — CARVEDILOL 6.25 MG: 6.25 TABLET, FILM COATED ORAL at 17:28

## 2021-09-09 RX ADMIN — CARVEDILOL 3.12 MG: 3.12 TABLET, FILM COATED ORAL at 08:46

## 2021-09-09 RX ADMIN — ONDANSETRON 4 MG: 2 INJECTION INTRAMUSCULAR; INTRAVENOUS at 12:25

## 2021-09-09 RX ADMIN — SODIUM CHLORIDE: 9 INJECTION, SOLUTION INTRAVENOUS at 03:14

## 2021-09-09 RX ADMIN — ALBUTEROL SULFATE 2 PUFF: 90 AEROSOL, METERED RESPIRATORY (INHALATION) at 15:22

## 2021-09-09 RX ADMIN — CLONIDINE HYDROCHLORIDE 0.1 MG: 0.1 TABLET ORAL at 14:45

## 2021-09-09 RX ADMIN — GUAIFENESIN 600 MG: 600 TABLET, EXTENDED RELEASE ORAL at 20:03

## 2021-09-09 RX ADMIN — DEXAMETHASONE 6 MG: 4 TABLET ORAL at 08:46

## 2021-09-09 RX ADMIN — ACETAMINOPHEN 325 MG: 325 TABLET ORAL at 05:14

## 2021-09-09 RX ADMIN — AZITHROMYCIN MONOHYDRATE 500 MG: 500 INJECTION, POWDER, LYOPHILIZED, FOR SOLUTION INTRAVENOUS at 08:46

## 2021-09-09 RX ADMIN — ALBUTEROL SULFATE 2 PUFF: 90 AEROSOL, METERED RESPIRATORY (INHALATION) at 11:26

## 2021-09-09 RX ADMIN — CEFTRIAXONE 1000 MG: 1 INJECTION, POWDER, FOR SOLUTION INTRAMUSCULAR; INTRAVENOUS at 08:46

## 2021-09-09 RX ADMIN — HYDRALAZINE HYDROCHLORIDE 10 MG: 20 INJECTION, SOLUTION INTRAMUSCULAR; INTRAVENOUS at 03:07

## 2021-09-09 RX ADMIN — ALBUTEROL SULFATE 2 PUFF: 90 AEROSOL, METERED RESPIRATORY (INHALATION) at 19:40

## 2021-09-09 ASSESSMENT — PAIN SCALES - GENERAL
PAINLEVEL_OUTOF10: 0
PAINLEVEL_OUTOF10: 0

## 2021-09-09 ASSESSMENT — ENCOUNTER SYMPTOMS: SHORTNESS OF BREATH: 1

## 2021-09-09 NOTE — PROGRESS NOTES
Pulmonary and Critical Care  Progress Note      VITALS:  BP (!) 154/94   Pulse 94   Temp 99.2 °F (37.3 °C) (Oral)   Resp 24   Wt 131 lb 6.3 oz (59.6 kg)   SpO2 94%   BMI 22.55 kg/m²     Subjective:   CHIEF COMPLAINT :Syncope     HPI:                The patient is lying in the bed. She is not in acute resp distress    Objective:   PHYSICAL EXAM:    LUNGS:Occasional right basal crackles  Abd-soft, BS+, NT  Ext- no pedal edema  CVS-s1s2, no murmurs      DATA:    CBC:  Recent Labs     09/07/21  0342 09/08/21  0745   WBC 8.2 13.1*   RBC 4.51 4.23   HGB 15.7 14.6   HCT 44.2 43.5   * 120*   MCV 98.0 102.8*   MCH 34.8* 34.5*   MCHC 35.5 33.6   RDW 13.5 14.1   SEGSPCT 82.5* 64.0   BANDSPCT  --  29*      BMP:  Recent Labs     09/08/21  1612 09/08/21  2233 09/09/21  0400   * 127* 130*   K 3.7 3.6 3.5   CL 92* 91* 93*   CO2 23 28 26   BUN 6 5* 5*   CREATININE 0.3* 0.3* 0.3*   CALCIUM 8.8 8.6 8.6   GLUCOSE 193* 138* 122*      ABG:  No results for input(s): PH, PO2ART, SZT8CLA, HCO3, BEART, O2SAT in the last 72 hours. BNP  No results found for: BNP   D-Dimer:  Lab Results   Component Value Date    DDIMER 377 (H) 09/09/2021      Radiology:   Infiltrate obscuring the right heart border which may be related to   atelectasis versus pneumonia, unchanged. 1.       Assessment/Plan     Patient Active Problem List    Diagnosis Date Noted    Hyponatremia     COVID-19 09/07/2021   RML Pneumonia suspected to be sec to COVID -23- improving  Hypoxia  Alcohol abuse  ? Syncope  COPD       1. Decadron  2. Abx  3. Inhalers  4. ICS  5. OOB  6. Keep sats > 92%  7. Watch for DT's  8. Await placement  9.  C.w present management    Electronically signed by Adelaide Velazco MD on 9/9/2021 at 9:41 AM

## 2021-09-09 NOTE — PROGRESS NOTES
Hospitalist Progress Note      Name:  Jayy Puri /Age/Sex: 1965  (63 y.o. female)   MRN & CSN:  [id number] & [id number] Admission Date/Time: 2021  3:26 AM   Location:  -A PCP: No primary care provider on file. Hospital Day: 3    Assessment and Plan:   62 y/o F that presented with syncope and SOB found to have COVID 19    Syncope  - No episodes since admission  - Echo completed; EF 55% with no valvular abnormalities noted  - Cardiology following, appreciate recs; Holter and Stress once COVID medically resolved  - Tele    SOB  COVID Pneumonia  - On 2L Oxygen thism AM  - Continue Decadron  - Will continue abx; procal/CRP elevated, will trend  - Spoke to patient regarding Remdesivir and she still declined this morning    Hyponatremia  - On admit 120  - Improved to 130 this AM  - Nephrology following, appreciate recs    History of Alcoholism  - CIWA  - Thiamine  - Monitor closely for withdrawal symptoms     Hx COPD  - Increasing wheezing and cough today  - Scheduled Guaifenasin and scheduled inhalers today  - Will monitor; pulmonology following, appreciate recs    Patient was seen in hospital for COPD, COVID-19 . I am prescribing oxygen because the diagnosis and testing requires the patient to have oxygen in the home. Conditions will improve or be benefited by oxygen use. The patient is able to perform good mobility and therefore requires the use of a portable oxygen system for ambulation. Diet ADULT DIET;  Regular   DVT Prophylaxis [] Lovenox, []  Heparin, [] SCDs, [] Ambulation   GI Prophylaxis [] PPI,  [] H2 Blocker,  [] Carafate,  [] Diet/Tube Feeds   Code Status Full Code   Disposition Patient requires continued admission due to    MDM [] Low, [] Moderate,[]  High  Patient's risk as above due to      History of Present Illness:     States she is doing well; denies any significant SOB; no withdrawal symptoms; no chills overnight, no chest pain    Objective: Intake/Output Summary (Last 24 hours) at 9/9/2021 1128  Last data filed at 9/9/2021 1036  Gross per 24 hour   Intake 240 ml   Output 325 ml   Net -85 ml      Vitals:   Vitals:    09/09/21 0900   BP:    Pulse:    Resp:    Temp:    SpO2: 94%     Physical Exam:   GEN Awake female, sitting upright in bed in no apparent distress. Appears given age. EYES Pupils are equally round. No scleral erythema, discharge, or conjunctivitis. HENT Mucous membranes are moist  NECK Supple, no apparent thyromegaly or masses. RESP Expiratory wheezing this morning; on 2L NC  CARDIO/VASC S1/S2 auscultated. Regular rate without appreciable murmurs, rubs, or gallops. No JVD or carotid bruits. Peripheral pulses equal bilaterally and palpable. No peripheral edema. GI Abdomen is soft without significant tenderness, masses, or guarding   No costovertebral angle tenderness  HEME/LYMPH No petechiae or ecchymoses. MSK No gross joint deformities. SKIN Normal coloration, warm, dry. NEURO Cranial nerves appear grossly intact, normal speech  PSYCH Awake, alert, oriented x 4. Affect appropriate.     Medications:   Medications:    guaiFENesin  600 mg Oral BID    albuterol sulfate HFA  2 puff Inhalation Q4H WA    cefTRIAXone (ROCEPHIN) IV  1,000 mg IntraVENous Q24H    azithromycin  500 mg IntraVENous Q24H    carvedilol  3.125 mg Oral BID WC    sodium chloride flush  5-40 mL IntraVENous 2 times per day    enoxaparin  30 mg SubCUTAneous BID    dexamethasone  6 mg Oral Daily    sodium chloride flush  5-40 mL IntraVENous 2 times per day      Infusions:    sodium chloride 75 mL/hr at 09/09/21 0314    sodium chloride Stopped (09/08/21 0937)    sodium chloride       PRN Meds: guaiFENesin-dextromethorphan, 5 mL, Q4H PRN  sodium chloride flush, 5-40 mL, PRN  sodium chloride, 25 mL, PRN  ondansetron, 4 mg, Q8H PRN   Or  ondansetron, 4 mg, Q6H PRN  polyethylene glycol, 17 g, Daily PRN  acetaminophen, 650 mg, Q6H PRN   Or  acetaminophen, 650 mg, Q6H PRN  sodium chloride flush, 5-40 mL, PRN  sodium chloride, 25 mL, PRN  LORazepam, 1 mg, Q1H PRN   Or  LORazepam, 1 mg, Q1H PRN   Or  LORazepam, 2 mg, Q1H PRN   Or  LORazepam, 2 mg, Q1H PRN   Or  LORazepam, 3 mg, Q1H PRN   Or  LORazepam, 3 mg, Q1H PRN   Or  LORazepam, 4 mg, Q1H PRN   Or  LORazepam, 4 mg, Q1H PRN          Electronically signed by Katya Nickerson MD on 9/9/2021 at 11:28 AM

## 2021-09-09 NOTE — PROGRESS NOTES
Cardiology Progress Note     Today's Plan: will sign off    Admit Date:  9/7/2021    Consult reason/ Seen today for: Syncope    Subjective and  Overnight Events: Patient reports shortness of breath is stable denies any chest pain or palpitations. Anxious and wanting to be discharge. Assessment / Plan / Recommendation:     1. Syncope:  likely vasovagal in the setting of viral infection, possible alcohol intoxication. Echocardiogram shows mild LVH, EF 55%. We will further evaluate as outpatient including Holter monitor and stress MPI once COVID-19 has resolved  ? Seizures     2. Shortness of breath and fever likely secondary to COVID-19: Patient is on IV antibiotics and steroids. 2 L NC pulmonary following   3. Nicotine dependence and COPD: In exacerbation. On IV steroids and breathing treatments  4. Hyponatremia secondary to alcohol use  5. DVT prophylaxis: Lovenox. 6. We will sign off, please reconsult for any new cardiac complaints concerns. History of Presenting Illness:    Chief complain on admission : 63 y. o.year old who is admitted for  Chief Complaint   Patient presents with    Shortness of Breath    Loss of Consciousness    Alcohol Intoxication    Fall        Past medical history:    has a past medical history of COVID-19 and Hypertension. Past surgical history:   has a past surgical history that includes Dilation and curettage of uterus and Cholecystectomy. Social History:   reports that she has been smoking cigarettes. She has been smoking about 1.00 pack per day. She has never used smokeless tobacco. She reports current alcohol use of about 12.0 standard drinks of alcohol per week. She reports that she does not use drugs. Family history:  family history is not on file. No Known Allergies    Review of Systems:  Review of Systems   Respiratory: Positive for shortness of breath.     Cardiovascular: Negative for chest pain, palpitations and leg swelling. Musculoskeletal: Negative. Skin: Negative. Neurological: Negative for dizziness and weakness. All other systems reviewed and are negative. BP (!) 154/94   Pulse 94   Temp 99.2 °F (37.3 °C) (Oral)   Resp 24   Wt 131 lb 6.3 oz (59.6 kg)   SpO2 92%   BMI 22.55 kg/m²       Intake/Output Summary (Last 24 hours) at 9/9/2021 1253  Last data filed at 9/9/2021 1036  Gross per 24 hour   Intake 120 ml   Output 225 ml   Net -105 ml       Physical Exam:  Due to the current efforts to prevent transmission of COVID-19 and also the need to preserve PPE for other caregivers, a face-to-face encounter with the patient was not performed. That being said, all relevant records and diagnostic tests were reviewed, including laboratory results and imaging. Please reference any relevant documentation elsewhere. Care will be coordinated with the primary service.       Medications:    guaiFENesin  600 mg Oral BID    albuterol sulfate HFA  2 puff Inhalation Q4H WA    carvedilol  6.25 mg Oral BID WC    cloNIDine  0.1 mg Oral Once    cefTRIAXone (ROCEPHIN) IV  1,000 mg IntraVENous Q24H    azithromycin  500 mg IntraVENous Q24H    sodium chloride flush  5-40 mL IntraVENous 2 times per day    enoxaparin  30 mg SubCUTAneous BID    dexamethasone  6 mg Oral Daily    sodium chloride flush  5-40 mL IntraVENous 2 times per day      sodium chloride 75 mL/hr at 09/09/21 0314    sodium chloride Stopped (09/08/21 0937)    sodium chloride       guaiFENesin-dextromethorphan, sodium chloride flush, sodium chloride, ondansetron **OR** ondansetron, polyethylene glycol, acetaminophen **OR** acetaminophen, sodium chloride flush, sodium chloride, LORazepam **OR** LORazepam **OR** LORazepam **OR** LORazepam **OR** LORazepam **OR** LORazepam **OR** LORazepam **OR** LORazepam    Lab Data:  CBC:   Recent Labs     09/07/21  0342 09/08/21  0745   WBC 8.2 13.1*   HGB 15.7 14.6   HCT 44.2 43.5   MCV 98.0 102.8*   * 120*     BMP:   Recent Labs     09/08/21  1612 09/08/21  2233 09/09/21  0400   * 127* 130*   K 3.7 3.6 3.5   CL 92* 91* 93*   CO2 23 28 26   BUN 6 5* 5*   CREATININE 0.3* 0.3* 0.3*     PT/INR:   Recent Labs     09/07/21  0342 09/08/21  0745   PROTIME 10.5* 9.2*   INR 0.82 0.72     BNP:  No results for input(s): PROBNP in the last 72 hours. TROPONIN:   Recent Labs     09/08/21  0745   TROPONINT <0.010        Impression:  Active Problems:    COVID-19    Hyponatremia  Resolved Problems:    * No resolved hospital problems. *       All labs, medications and tests reviewed by myself, continue all other medications of all above medical condition listed as is except for changes mentioned above. Thank you   Please call with questions. Electronically signed by Chelo Rothman. MODESTO Persaud CNP on 9/9/2021 at 12:53 PM         CARDIOLOGY ATTENDING ADDENDUM    I have seen, spoken to and examined this patient personally, independently of the nurse practitioner. I have reviewed the hospital care given to date and reviewed all pertinent labs and imaging. The plan was developed mutually at the time of the visit with the patient,  NP   and myself. I have spoken with patient, nursing staff and provided written and verbal instructions . The above note has been reviewed and I agree with the assessment, diagnosis, and treatment plan with changes made by me as follows       HPI:  I have reviewed the above HPI  And agree with above   Please review addendum/changes made to note above     Interval history:      Doing well     Physical Exam:    Due to the current efforts to prevent transmission of COVID-19 and also the need to preserve PPE for other caregivers, a face-to-face encounter with the patient was not performed. That being said, all relevant records and diagnostic tests were reviewed, including laboratory results and imaging. Please reference any relevant documentation elsewhere.  Care will be coordinated with the primary service. MEDICAL DECISION MAKING;    I agree with the above plan, which was planned by myself and discussed with NP. Doing fairly well. Echocardiogram unremarkable. Syncope likely secondary to vasovagal in origin secondary to viral infection  Recommend outpatient follow-up for further work-up.   Please call us with questions      Dr. Sherren Counter, MD

## 2021-09-09 NOTE — PROGRESS NOTES
Nephrology Progress Note  9/9/2021 12:50 PM  Subjective: Interval History: Jayy Puri is a 63 y.o. female with some anxiety and hoping go home        Data:   Scheduled Meds:   guaiFENesin  600 mg Oral BID    albuterol sulfate HFA  2 puff Inhalation Q4H WA    cefTRIAXone (ROCEPHIN) IV  1,000 mg IntraVENous Q24H    azithromycin  500 mg IntraVENous Q24H    carvedilol  3.125 mg Oral BID WC    sodium chloride flush  5-40 mL IntraVENous 2 times per day    enoxaparin  30 mg SubCUTAneous BID    dexamethasone  6 mg Oral Daily    sodium chloride flush  5-40 mL IntraVENous 2 times per day     Continuous Infusions:   sodium chloride 75 mL/hr at 09/09/21 0314    sodium chloride Stopped (09/08/21 0937)    sodium chloride           CBC   Recent Labs     09/07/21  0342 09/08/21  0745   WBC 8.2 13.1*   HGB 15.7 14.6   HCT 44.2 43.5   * 120*      BMP   Recent Labs     09/08/21  1612 09/08/21  2233 09/09/21  0400   * 127* 130*   K 3.7 3.6 3.5   CL 92* 91* 93*   CO2 23 28 26   BUN 6 5* 5*   CREATININE 0.3* 0.3* 0.3*     Hepatic:   Recent Labs     09/07/21  0342 09/08/21  0745   * 116*   * 104*   BILITOT 0.4 0.3   ALKPHOS 99 83     Troponin: No results for input(s): TROPONINI in the last 72 hours. BNP: No results for input(s): BNP in the last 72 hours. Lipids: No results for input(s): CHOL, HDL in the last 72 hours.     Invalid input(s): LDLCALCU  ABGs: No results found for: PHART, PO2ART, NZX9YWY  INR:   Recent Labs     09/07/21  0342 09/08/21  0745   INR 0.82 0.72     Renal Labs  Albumin:    Lab Results   Component Value Date    LABALBU 3.3 09/08/2021     Calcium:    Lab Results   Component Value Date    CALCIUM 8.6 09/09/2021     Phosphorus:  No results found for: PHOS  U/A:    Lab Results   Component Value Date    NITRU NEGATIVE 09/08/2021    COLORU YELLOW 09/08/2021    WBCUA <1 09/08/2021    RBCUA <1 09/08/2021    MUCUS RARE 09/08/2021    TRICHOMONAS NONE SEEN 09/08/2021    BACTERIA NEGATIVE 09/08/2021    CLARITYU CLEAR 09/08/2021    SPECGRAV 1.014 09/08/2021    UROBILINOGEN NEGATIVE 09/08/2021    BILIRUBINUR NEGATIVE 09/08/2021    BLOODU SMALL 09/08/2021    KETUA LARGE 09/08/2021     ABG:  No results found for: PHART, WUB6YVI, PO2ART, HWX7UPT, BEART, THGBART, FHJ3CLN, U7URFFAJ  HgBA1c:  No results found for: LABA1C  Microalbumen/Creatinine ratio:  No components found for: RUCREAT  TSH:  No results found for: TSH  IRON:  No results found for: IRON  Iron Saturation:  No components found for: PERCENTFE  TIBC:  No results found for: TIBC  FERRITIN:    Lab Results   Component Value Date    FERRITIN 4,178 09/08/2021     RPR:  No results found for: RPR  JANNETH:  No results found for: ANATITER, JANNETH  24 Hour Urine for Creatinine Clearance:  No components found for: CREAT4, UHRS10, UTV10      Objective:   I/O: 09/08 0701 - 09/09 0700  In: 240 [P.O.:240]  Out: 575 [Urine:575]  I/O last 3 completed shifts: In: 240 [P.O.:240]  Out: 575 [Urine:575]  I/O this shift:  In: 120 [P.O.:120]  Out: -   Vitals: BP (!) 154/94   Pulse 94   Temp 99.2 °F (37.3 °C) (Oral)   Resp 24   Wt 131 lb 6.3 oz (59.6 kg)   SpO2 92%   BMI 22.55 kg/m²  {  General appearance: awake weak  HEENT: Head: Normal, normocephalic, atraumatic. Neck: supple, symmetrical, trachea midline  Lungs: diminished breath sounds bilaterally  Heart: S1, S2 normal  Abdomen: abnormal findings:  soft nt  Extremities: edema trace  Neurologic: Mental status: alertness: alert anxious        Assessment and Plan:      IMP:  1.  COVID-19 positive  2. Hyponatremia  3. Hypertension  4. Hypokalemia  5. Anxiety  6.   Alcohol use abuse    Plan     1 wean o2 and stable recover covid  2 na improved back to 130 and monitor  3 bp increase and adjust dose but also anxious  4 monitor K  5 watch DT  Can work on [employer] plan from renal if o2 stable             Lindalee Coast, MD, MD

## 2021-09-10 VITALS
HEIGHT: 64 IN | BODY MASS INDEX: 22.24 KG/M2 | DIASTOLIC BLOOD PRESSURE: 92 MMHG | WEIGHT: 130.29 LBS | SYSTOLIC BLOOD PRESSURE: 129 MMHG | HEART RATE: 83 BPM | OXYGEN SATURATION: 95 % | RESPIRATION RATE: 27 BRPM | TEMPERATURE: 97.9 F

## 2021-09-10 PROBLEM — U07.1 COVID-19: Status: RESOLVED | Noted: 2021-09-07 | Resolved: 2021-09-10

## 2021-09-10 LAB
ANION GAP SERPL CALCULATED.3IONS-SCNC: 11 MMOL/L (ref 4–16)
ANION GAP SERPL CALCULATED.3IONS-SCNC: 11 MMOL/L (ref 4–16)
ANION GAP SERPL CALCULATED.3IONS-SCNC: 9 MMOL/L (ref 4–16)
BUN BLDV-MCNC: 6 MG/DL (ref 6–23)
BUN BLDV-MCNC: 7 MG/DL (ref 6–23)
BUN BLDV-MCNC: 7 MG/DL (ref 6–23)
CALCIUM SERPL-MCNC: 8.3 MG/DL (ref 8.3–10.6)
CALCIUM SERPL-MCNC: 8.3 MG/DL (ref 8.3–10.6)
CALCIUM SERPL-MCNC: 8.7 MG/DL (ref 8.3–10.6)
CHLORIDE BLD-SCNC: 95 MMOL/L (ref 99–110)
CHLORIDE BLD-SCNC: 95 MMOL/L (ref 99–110)
CHLORIDE BLD-SCNC: 98 MMOL/L (ref 99–110)
CO2: 27 MMOL/L (ref 21–32)
CO2: 27 MMOL/L (ref 21–32)
CO2: 28 MMOL/L (ref 21–32)
CREAT SERPL-MCNC: 0.3 MG/DL (ref 0.6–1.1)
D DIMER: 479 NG/ML(DDU)
GFR AFRICAN AMERICAN: >60 ML/MIN/1.73M2
GFR NON-AFRICAN AMERICAN: >60 ML/MIN/1.73M2
GLUCOSE BLD-MCNC: 124 MG/DL (ref 70–99)
GLUCOSE BLD-MCNC: 148 MG/DL (ref 70–99)
GLUCOSE BLD-MCNC: 158 MG/DL (ref 70–99)
POTASSIUM SERPL-SCNC: 3.2 MMOL/L (ref 3.5–5.1)
POTASSIUM SERPL-SCNC: 3.3 MMOL/L (ref 3.5–5.1)
POTASSIUM SERPL-SCNC: 3.7 MMOL/L (ref 3.5–5.1)
SODIUM BLD-SCNC: 133 MMOL/L (ref 135–145)
SODIUM BLD-SCNC: 133 MMOL/L (ref 135–145)
SODIUM BLD-SCNC: 135 MMOL/L (ref 135–145)

## 2021-09-10 PROCEDURE — 94761 N-INVAS EAR/PLS OXIMETRY MLT: CPT

## 2021-09-10 PROCEDURE — 6370000000 HC RX 637 (ALT 250 FOR IP): Performed by: INTERNAL MEDICINE

## 2021-09-10 PROCEDURE — 6360000002 HC RX W HCPCS: Performed by: INTERNAL MEDICINE

## 2021-09-10 PROCEDURE — 2700000000 HC OXYGEN THERAPY PER DAY

## 2021-09-10 PROCEDURE — 84145 PROCALCITONIN (PCT): CPT

## 2021-09-10 PROCEDURE — 94640 AIRWAY INHALATION TREATMENT: CPT

## 2021-09-10 PROCEDURE — 2580000003 HC RX 258: Performed by: INTERNAL MEDICINE

## 2021-09-10 PROCEDURE — 99232 SBSQ HOSP IP/OBS MODERATE 35: CPT | Performed by: INTERNAL MEDICINE

## 2021-09-10 PROCEDURE — 36415 COLL VENOUS BLD VENIPUNCTURE: CPT

## 2021-09-10 PROCEDURE — 94618 PULMONARY STRESS TESTING: CPT

## 2021-09-10 PROCEDURE — 85379 FIBRIN DEGRADATION QUANT: CPT

## 2021-09-10 PROCEDURE — 86141 C-REACTIVE PROTEIN HS: CPT

## 2021-09-10 PROCEDURE — 80048 BASIC METABOLIC PNL TOTAL CA: CPT

## 2021-09-10 RX ORDER — ALBUTEROL SULFATE 90 UG/1
2 AEROSOL, METERED RESPIRATORY (INHALATION)
Qty: 18 G | Refills: 3 | Status: SHIPPED | OUTPATIENT
Start: 2021-09-10

## 2021-09-10 RX ORDER — DEXAMETHASONE 6 MG/1
6 TABLET ORAL DAILY
Qty: 6 TABLET | Refills: 0 | Status: SHIPPED | OUTPATIENT
Start: 2021-09-11 | End: 2021-09-10

## 2021-09-10 RX ORDER — DEXAMETHASONE 6 MG/1
TABLET ORAL
Qty: 10 TABLET | Refills: 0 | Status: SHIPPED | OUTPATIENT
Start: 2021-09-10 | End: 2021-09-18

## 2021-09-10 RX ORDER — GUAIFENESIN 600 MG/1
600 TABLET, EXTENDED RELEASE ORAL 2 TIMES DAILY
Qty: 14 TABLET | Refills: 0 | Status: SHIPPED | OUTPATIENT
Start: 2021-09-10 | End: 2021-09-17

## 2021-09-10 RX ORDER — CARVEDILOL 6.25 MG/1
6.25 TABLET ORAL 2 TIMES DAILY WITH MEALS
Qty: 60 TABLET | Refills: 3 | Status: SHIPPED | OUTPATIENT
Start: 2021-09-10 | End: 2021-10-28 | Stop reason: ALTCHOICE

## 2021-09-10 RX ORDER — POTASSIUM CHLORIDE 20 MEQ/1
40 TABLET, EXTENDED RELEASE ORAL 2 TIMES DAILY WITH MEALS
Qty: 4 TABLET | Refills: 0 | Status: SHIPPED | OUTPATIENT
Start: 2021-09-10 | End: 2021-12-29

## 2021-09-10 RX ORDER — DEXAMETHASONE 4 MG/1
6 TABLET ORAL EVERY 12 HOURS SCHEDULED
Status: DISCONTINUED | OUTPATIENT
Start: 2021-09-10 | End: 2021-09-10 | Stop reason: HOSPADM

## 2021-09-10 RX ORDER — LEVOFLOXACIN 750 MG/1
750 TABLET ORAL DAILY
Qty: 7 TABLET | Refills: 0 | Status: SHIPPED | OUTPATIENT
Start: 2021-09-10 | End: 2021-09-17

## 2021-09-10 RX ORDER — POTASSIUM CHLORIDE 20 MEQ/1
40 TABLET, EXTENDED RELEASE ORAL 2 TIMES DAILY WITH MEALS
Status: DISCONTINUED | OUTPATIENT
Start: 2021-09-10 | End: 2021-09-10 | Stop reason: HOSPADM

## 2021-09-10 RX ADMIN — Medication 10 ML: at 07:42

## 2021-09-10 RX ADMIN — CARVEDILOL 6.25 MG: 6.25 TABLET, FILM COATED ORAL at 07:41

## 2021-09-10 RX ADMIN — CEFTRIAXONE 1000 MG: 1 INJECTION, POWDER, FOR SOLUTION INTRAMUSCULAR; INTRAVENOUS at 07:41

## 2021-09-10 RX ADMIN — ALBUTEROL SULFATE 2 PUFF: 90 AEROSOL, METERED RESPIRATORY (INHALATION) at 08:46

## 2021-09-10 RX ADMIN — POTASSIUM CHLORIDE 40 MEQ: 1500 TABLET, EXTENDED RELEASE ORAL at 07:42

## 2021-09-10 RX ADMIN — DEXAMETHASONE 6 MG: 4 TABLET ORAL at 07:42

## 2021-09-10 RX ADMIN — ALBUTEROL SULFATE 2 PUFF: 90 AEROSOL, METERED RESPIRATORY (INHALATION) at 12:07

## 2021-09-10 RX ADMIN — GUAIFENESIN 600 MG: 600 TABLET, EXTENDED RELEASE ORAL at 07:41

## 2021-09-10 RX ADMIN — ALBUTEROL SULFATE 2 PUFF: 90 AEROSOL, METERED RESPIRATORY (INHALATION) at 16:03

## 2021-09-10 RX ADMIN — ENOXAPARIN SODIUM 30 MG: 30 INJECTION SUBCUTANEOUS at 07:43

## 2021-09-10 RX ADMIN — AZITHROMYCIN MONOHYDRATE 500 MG: 500 INJECTION, POWDER, LYOPHILIZED, FOR SOLUTION INTRAVENOUS at 07:41

## 2021-09-10 ASSESSMENT — PAIN SCALES - GENERAL: PAINLEVEL_OUTOF10: 0

## 2021-09-10 NOTE — PROGRESS NOTES
Patient was seen in hospital for COPD, COVID-19 . I am prescribing oxygen because the diagnosis and testing requires the patient to have oxygen in the home. Conditions will improve or be benefited by oxygen use. The patient is able to perform good mobility and therefore requires the use of a portable oxygen system for ambulation.

## 2021-09-10 NOTE — DISCHARGE SUMMARY
Discharge Summary    Name:  Helen Silva /Age/Sex: 1965  (63 y.o. female)   MRN & CSN:  [id number] & [id number] Admission Date/Time: 2021  3:26 AM   Attending:  Bari Epley, MD Discharging Physician: Bari Epley, MD     Hospital Course:   64 y/o F that presented with syncope, SOB found to have COVID pneumonia and hyponatremia. Patient was seen and evaluated by Cardiology, Pulmonology, as well as Nephrology. Patient was started on IV saline with improvement in sodium. Patient was also seen by Cardiology who ordered an Echo with no significant abnormalities. It was recommended that the patient follows up with Cardiology as an outpatient for a stress test and Holter monitor; follow up was provided in discharge summary. Pulmonology also followed the patient. Patient did progress well and refused Remdesivir but was agreeable to steroids and antibiotics which patient did well with. On day of discharge patient did have wheezing but stated that she felt much better and requested to go home. A long acting inhaler was added to her regimen the day of discharge and patient was encouraged to stay one more day for observation but patient refused. Patient understood the risks of leaving but stated that she had plenty of care at home. I instructed the patient to come back to the hospital if she were to experience any further shortness of breath and she understood. Patient did have a home oxygen evaluation prior to discharge and qualified for home oxygen which was given to the patient prior to discharge. Patient was discharged in stable condition.     Syncope  - f/u Cardiology provided; she will be set up with outpatient stress and holter at her follow-up appointment     SOB  COVID Pneumonia  - Encouraged to stay one more day given wheezing but patient refused  - Did qualify for home oxygen, given to patient prior to discharge  - Discharged with Decadron and taper  - Long acting inhaler given to patient with Albuterol rescue inhaler  - f/u Pulmonology as outpatient     Hyponatremia  - Resolved  - CMP, Mag, and Phos  - f/u Dr. Gloria Mask     History of Alcoholism  - Required only one dose of Ativan while hospitalized  - Rehab options provided to patient by  in discharge instructions     Hx COPD  - Discharged with maintenance and rescue inhaler as well as decadron steroid taper  - f/u Pulmonology as outpatient    HTN  - Patient discharged with Coreg     Patient was seen in hospital for COPD, COVID-19 . I am prescribing oxygen because the diagnosis and testing requires the patient to have oxygen in the home. Conditions will improve or be benefited by oxygen use. The patient is able to perform good mobility and therefore requires the use of a portable oxygen system for ambulation. The patient expressed appropriate understanding of and agreement with the discharge recommendations, medications, and plan. Consults this admission:  IP CONSULT TO HOSPITALIST  IP CONSULT TO NEPHROLOGY  IP CONSULT TO PULMONOLOGY  IP CONSULT TO SOCIAL WORK  IP CONSULT TO CARDIOLOGY    Discharge Instruction:   Follow up appointments: Cardiology, Pulmonology  Primary care physician:  within 2 weeks    Diet:  regular diet and cardiac diet   Activity: activity as tolerated  Disposition: Discharged to:   [x]Home, []C, []SNF, []Acute Rehab, []Hospice   Condition on discharge: Stable    Discharge Medications:        [facility] Medication Instructions KAE:[id number]    Printed on:09/10/21 1343   Medication Information                      albuterol sulfate  (90 Base) MCG/ACT inhaler  Inhale 2 puffs into the lungs every 4 hours (while awake)             carvedilol (COREG) 6.25 MG tablet  Take 1 tablet by mouth 2 times daily (with meals)             dexamethasone (DECADRON) 6 MG tablet  Take 1 tablet by mouth 2 times daily for 3 days, THEN 1 tablet daily for 3 days, THEN 0.5 tablets daily for 2 days. guaiFENesin (MUCINEX) 600 MG extended release tablet  Take 1 tablet by mouth 2 times daily for 7 days             levoFLOXacin (LEVAQUIN) 750 MG tablet  Take 1 tablet by mouth daily for 7 days             potassium chloride (KLOR-CON M) 20 MEQ extended release tablet  Take 2 tablets by mouth 2 times daily (with meals) for 1 day             tiotropium-olodaterol (STIOLTO) 2.5-2.5 MCG/ACT AERS  Inhale 2 puffs into the lungs daily                   Objective Findings at Discharge:   BP (!) 129/92   Pulse 83   Temp 97.9 °F (36.6 °C) (Oral)   Resp 27   Ht 5' 4.02\" (1.626 m)   Wt 130 lb 4.7 oz (59.1 kg)   SpO2 95%   BMI 22.35 kg/m²            GEN    Awake female, sitting upright in bed in no apparent distress. Appears given age. EYES   Pupils are equally round. No scleral erythema, discharge, or conjunctivitis. HENT  Mucous membranes are moist  NECK  Supple, no apparent thyromegaly or masses. RESP  Occasional wheeze, on 2L NC  CARDIO/VASC           S1/S2 auscultated. Regular rate without appreciable murmurs, rubs, or gallops. No JVD or carotid bruits. Peripheral pulses equal bilaterally and palpable. No peripheral edema. GI        Abdomen is soft without significant tenderness, masses, or guarding         No costovertebral angle tenderness  HEME/LYMPH            No petechiae or ecchymoses. MSK    No gross joint deformities. SKIN    Normal coloration, warm, dry. NEURO           Cranial nerves appear grossly intact, normal speech  PSYCH            Awake, alert, oriented x 4. Affect appropriate.     BMP/CBC  Recent Labs     09/08/21  0745 09/08/21  1255 09/09/21  2243 09/10/21  0356 09/10/21  1125   *  136   < > 133* 135 133*   K 3.6  3.6   < > 3.3* 3.2* 3.7   CL 96*  99   < > 95* 98* 95*   CO2 22  22   < > 27 28 27   BUN 7  7   < > 6 7 7   CREATININE 0.3*  0.3*   < > 0.3* 0.3* 0.3*   WBC 13.1*  --   --   --   --    HCT 43.5  --   --   --   --    *  --   --   --   --     < > = values in this interval not displayed. IMAGING:  CTH:  1. No evidence of acute intracranial trauma. 2. Moderate cerebral white matter chronic microvascular ischemic disease. 3. No evidence of acute cervical spine abnormality. 4. Note: Evaluation of cervical spine limited by patient motion related   artifact. CT C-spine:  1. No evidence of acute intracranial trauma. 2. Moderate cerebral white matter chronic microvascular ischemic disease. 3. No evidence of acute cervical spine abnormality. 4. Note: Evaluation of cervical spine limited by patient motion related   artifact.          Discharge Time of 35 minutes    Electronically signed by Rogelio Clement MD on 9/10/2021 at 1:30 PM

## 2021-09-10 NOTE — PROGRESS NOTES
9/10/2021 9:57 AM  Patient Room #: 0971/2620-D  Patient Name: Shelbi Vu    (Step 1 Done by RN if possible otherwise call Pulmonary Diagnostics)  1. Place patient on room air at rest for at least 30 minutes. If patient falls below 88% before 30 minutes then you can record the level and stop. Record room air saturation level 87 %. If patient is at 88% or below, they will qualify for home oxygen and you can stop. If level does not fall below 88%, fill in level above. If indicated continue to Step 2. Signature:charlotte Perez RRT Date:09/10/2021 ___  (Step 2&3 Done by [facility])  2. Ambulate patient on room air until saturation falls below 89%. Record level of room air saturation with ambulation___ %. Next, place patient back on ___lpm oxygen and ambulate, record level __%. (Note:  this level must show improvement from room air level done with ambulation.)  If patients saturation on room air with ambulation is 88% or below AND patient shows improvement with oxygen during ambulation, they will qualify for home oxygen and you can stop. If patient does not drop below 89%, then patient should have an overnight oximetry trending on room air to see if level falls below 88%. Complete level in Step 3 below. 3. Room air overnight oximetry level 88 % for___  cumulative minutes. If patients room air oxygen level is < 89% for at least 5 cumulative minutes, patient will qualify for home oxygen and you can stop. (Attach Night Trending Report)    Complete order below: Diagnosis:COVID-19, COPD  Home oxygen at:  Length of Need: x Lifetime ?  3 Months     __2_lpm or __%   via  [x] nasal cannula  []mask  [] other:___         [x]continuous [x]  with activity  [x]  Nocturnal   [x] Portable Tanks [x]  Concentrator        Therapist Signature:Charlotte Perez RRT   Date: 09/10/2021 ___  Physician Signature:  __Electronically Signed in EMR_    Date:___  Physician Printed Name:Ordering User: Hong Vasquez MD  Provider ID: [id number]  NPI:  [id number]  [x] Patient Qualifies      [] Patient Does NOT qualify

## 2021-09-10 NOTE — PROGRESS NOTES
Home Oxygen Evaluation is complete and has been faxed to [facility] when [facility] is discharged for delivery of an oxygen concentrator.

## 2021-09-10 NOTE — PROGRESS NOTES
Nephrology Progress Note  9/10/2021 2:18 PM        Subjective:   Admit Date: 9/7/2021  PCP: No primary care provider on file. Interval History: Patient seen in early morning, this is a late entry    Diet: Per patient eating little bit better    ROS: No confusion or overt shortness of breath    Data:     Current meds:    potassium chloride  40 mEq Oral BID WC    tiotropium-olodaterol  2 puff Inhalation Daily    dexamethasone  6 mg Oral 2 times per day    guaiFENesin  600 mg Oral BID    albuterol sulfate HFA  2 puff Inhalation Q4H WA    carvedilol  6.25 mg Oral BID WC    cefTRIAXone (ROCEPHIN) IV  1,000 mg IntraVENous Q24H    azithromycin  500 mg IntraVENous Q24H    sodium chloride flush  5-40 mL IntraVENous 2 times per day    enoxaparin  30 mg SubCUTAneous BID    sodium chloride flush  5-40 mL IntraVENous 2 times per day      sodium chloride Stopped (09/08/21 0937)    sodium chloride           I/O last 3 completed shifts: In: 360 [P.O.:360]  Out: -     CBC:   Recent Labs     09/08/21  0745   WBC 13.1*   HGB 14.6   *          Recent Labs     09/09/21  2243 09/10/21  0356 09/10/21  1125   * 135 133*   K 3.3* 3.2* 3.7   CL 95* 98* 95*   CO2 27 28 27   BUN 6 7 7   CREATININE 0.3* 0.3* 0.3*   GLUCOSE 158* 124* 148*       Lab Results   Component Value Date    CALCIUM 8.7 09/10/2021       Objective:     Vitals: BP (!) 129/92   Pulse 83   Temp 97.9 °F (36.6 °C) (Oral)   Resp 27   Ht 5' 4.02\" (1.626 m)   Wt 130 lb 4.7 oz (59.1 kg)   SpO2 95%   BMI 22.35 kg/m²     General appearance: Thin, alert awake and oriented  HEENT: No gross conjunctival pallor-she is wearing oxygen per nasal cannula  Neck: Supple  Lungs: Seems regular rate and rhythm  Heart: Does have adventitious breath sound  Abdomen: Soft nontender  Extremities: No edema      Problem List :         Impression :     1.  Hyponatremia-urinary indicis did not suggest hypovolemia-unless that was done at the face of isotonic solution-but her BUN is extremely low-low protein intake might be an etiology-also with COVID-19-ectopic ADH is a possibility, also her low potassium might have lowered the sodium level-her sodium is acceptable now  2. COVID-19    Recommendation/Plan  :     1. High-protein diet should help  2. Also if she stays here then urea powder can be added  3. From the sodium standpoint she can be discharged  4. She will need a CMP, magnesium, phosphorus in in a week or 2  5. And follow-up with Dr. Arias Lawrence  in 2 to 3 weeks  6.  Otherwise follow clinically      Da Barber MD MD

## 2021-09-10 NOTE — PROGRESS NOTES
Pulmonary and Critical Care  Progress Note      VITALS:  BP (!) 149/91   Pulse 87   Temp 99.9 °F (37.7 °C) (Oral)   Resp 22   Wt 130 lb 4.7 oz (59.1 kg)   SpO2 (!) 87%   BMI 22.36 kg/m²     Subjective:   Chief complaint:?  Syncope, COVID-19 infection with suspected right middle lobe pneumonia, COPD, bronchospasm  Mild resp distress-complaining about more wheezing  Patient awake and alert  No chest pain or discomfort  Not on long-acting bronchodilators and currently just on Decadron, albuterol every 4 hours MDI  Objective:   PHYSICAL EXAM:    LUNGS: Scattered bilateral wheezes. Mildly diminished breath sounds throughout all lung fields. No basilar rhonchi or rales heard  Home O2 evaluation with O2 saturation 87% on room air  On 2 L she is currently 93%  Serum sodium 135 potassium 3.2 creatinine 0.3 with a BUN of 7  No CBC today  [employer] negative  SARS-CoV 2 detected on 09/07/2021  DATA:    CBC:  Recent Labs     09/08/21  0745   WBC 13.1*   RBC 4.23   HGB 14.6   HCT 43.5   *   .8*   MCH 34.5*   MCHC 33.6   RDW 14.1   SEGSPCT 64.0   BANDSPCT 29*      BMP:  Recent Labs     09/09/21  1418 09/09/21  2243 09/10/21  0356   * 133* 135   K 3.7 3.3* 3.2*   CL 93* 95* 98*   CO2 26 27 28   BUN 6 6 7   CREATININE 0.4* 0.3* 0.3*   CALCIUM 8.9 8.3 8.3   GLUCOSE 162* 158* 124*      ABG:  No results for input(s): PH, PO2ART, AYT5IQD, HCO3, BEART, O2SAT in the last 72 hours. BNP  No results found for: BNP   D-Dimer:  Lab Results   Component Value Date    DDIMER 377 (H) 09/09/2021      Radiology: Chest x-ray 9/9/2021  . Infiltrate obscuring the right heart border which may be related to   atelectasis versus pneumonia, unchanged. Assessment:     Patient Active Problem List   Diagnosis   (none) - all problems resolved or deleted       Plan:   1. continue present treatment  2.  Add long-acting bronchodilator with Stiolto 2 inhalations once a day, albuterol rescue 2 puffs every 12 hours and increase Decadron to twice daily  3. Consider CT chest as outpatient if infiltrate/possible atelectasis along right heart border fails to clear on follow-up chest x-ray in 4 to 6 weeks  4.  Continue smoking cessation    Susan Cartagena MD MD  9/10/2021  11:02 AM

## 2021-09-10 NOTE — CARE COORDINATION
Attempted to reach patient by phone; unsuccessful.  Plan for discharge to home soon; all ETOH recovery services added to AVS. Annette Sanchez RN

## 2021-09-11 LAB
HIGH SENSITIVE C-REACTIVE PROTEIN: 49.5 MG/L
PROCALCITONIN: 0.5

## 2021-09-13 ENCOUNTER — CARE COORDINATION (OUTPATIENT)
Dept: CASE MANAGEMENT | Age: 56
End: 2021-09-13

## 2021-09-13 DIAGNOSIS — U07.1 COVID-19: Primary | ICD-10-CM

## 2021-09-13 NOTE — CARE COORDINATION
[facility] Transitions Initial Follow Up Call    Call within 2 business days of discharge: Yes    Patient: Thomas Gonzalez Patient : 1965   MRN: <[id number]>  Reason for Admission: Covid  Discharge Date: 9/10/21 RARS: Readmission Risk Score: 11      Last Discharge [address] [address]       Complaint Diagnosis Description Type Department Provider    21 Shortness of Breath; Loss of Consciousness; Alcohol Intoxication; Fall Hyponatremia ED to Hosp-Admission (Discharged) (ADMITTED) Lakisha Fish MD; SHOSHANA Kathleen. Spoke with: [place] 7: [facility]    Non-face-to-face services provided:  Obtained and reviewed discharge summary and/or continuity of care documents   Transitions of Care Initial Call    Was this an external facility discharge? No     Challenges to be reviewed by the provider   Additional needs identified to be addressed with provider: No  none             Method of communication with provider : none      Advance Care Planning:   Does patient have an Advance Directive: Not on file  Was this a readmission? No  Patient stated reason for admission: Covid  Patients top risk factors for readmission: ineffective coping    Care Transition Nurse (CTN) contacted the patient by telephone to perform post hospital discharge assessment. Verified name and  with patient as identifiers. Provided introduction to self, and explanation of the CTN role. CTN reviewed discharge instructions, medical action plan and red flags with patient who verbalized understanding. Patient given an opportunity to ask questions and does not have any further questions or concerns at this time. Were discharge instructions available to patient? Yes. Reviewed appropriate site of care based on symptoms and resources available to patient including: PCP, Specialist, Urgent care clinics and When to call 911. The patient agrees to contact the PCP office for questions related to their healthcare. Medication reconciliation was performed with patient, who verbalizes understanding of administration of home medications. Advised obtaining a 90-day supply of all daily and as-needed medications. Covid Risk Education     Educated patient about risk for severe COVID-19 due to risk factors according to CDC guidelines. LPN CC reviewed discharge instructions, medical action plan and red flag symptoms with the patient who verbalized understanding. Discussed COVID vaccination status: Yes. Education provided on COVID-19 vaccination as appropriate. Discussed exposure protocols and quarantine with CDC Guidelines. Patient was given an opportunity to verbalize any questions and concerns and agrees to contact LPN CC or health care provider for questions related to their healthcare. Reviewed and educated patient on any new and changed medications related to discharge diagnosis. Was patient discharged with a pulse oximeter? No Discussed and confirmed pulse oximeter discharge instructions and when to notify provider or seek emergency care. LPN CC provided contact information. Plan for follow-up call in 5-7 days based on severity of symptoms and risk factors. Plan for next call: symptom management-SOB     This [facility] spoke with pt and pt stated that she was doing \"ok\" but stated that sh was still SOB at times. Pt is currently using 2L of O2 continuous. Patient denied any worsening symptoms. Denied fever, chills, N/V at this time. Denied difficulty with urination, BMs or appetite. Medication review performed and patient verbalized understanding and is taking all medications as prescribed. Pt stated that she was waiting on the Decadron and that her pharmacy will not have that available until 09/15/2021. Pt stated that she does not currently have a PCP.  This [facility] encouraged pt to refer to the list provided at discharge and secure a PCP as soon as possible and to schedule f/u with cardio and pulmonary as soon as possible and pt agreed. Advised pt to immediately report any worsening symptoms to the PCP. Patient verbalized understanding and agreed. Collette Littlejohn LPN, [facility]  PH: [phone]              Care Transitions 24 Hour Call    Schedule Follow Up Appointment with PCP: Declined  Do you have any ongoing symptoms?: Yes  Patient-reported symptoms: Shortness of Breath  Do you have a copy of your discharge instructions?: Yes  Do you have all of your prescriptions and are they filled?: No  Have you been contacted by a [facility] Pharmacist?: No  Have you scheduled your follow up appointment?: No  Were you discharged with any Home Care or Post Acute Services: No  Do you feel like you have everything you need to keep you well at home?: Yes  Care Transitions Interventions  No Identified Needs         Follow Up  No future appointments.     Collette Littlejohn LPN

## 2021-09-20 ENCOUNTER — CARE COORDINATION (OUTPATIENT)
Dept: CASE MANAGEMENT | Age: 56
End: 2021-09-20

## 2021-09-20 NOTE — CARE COORDINATION
[facility] Transitions Follow Up Call    2021    Patient: Juventino Acevedo  Patient : 1965   MRN: <[id number]>  Reason for Admission:  Covid 19  Discharge Date: 9/10/21 RARS: Readmission Risk Score: 11         Attempted to contact patient for follow up transition call. Left voicemail message to return call. Contact information provided. Will continue to follow up. Care Transitions Subsequent and Final Call    Subsequent and Final Calls  Care Transitions Interventions  Other Interventions:            Follow Up  Future Appointments   Date Time Provider Mallorie Galvin   2021  3:50 PM [address] Gomes MD [facility]       Shea Brown LPN

## 2021-09-28 ENCOUNTER — OFFICE VISIT (OUTPATIENT)
Dept: CARDIOLOGY CLINIC | Age: 56
End: 2021-09-28
Payer: COMMERCIAL

## 2021-09-28 ENCOUNTER — NURSE ONLY (OUTPATIENT)
Dept: CARDIOLOGY CLINIC | Age: 56
End: 2021-09-28
Payer: COMMERCIAL

## 2021-09-28 VITALS
HEART RATE: 95 BPM | SYSTOLIC BLOOD PRESSURE: 98 MMHG | WEIGHT: 133 LBS | HEIGHT: 64 IN | DIASTOLIC BLOOD PRESSURE: 70 MMHG | BODY MASS INDEX: 22.71 KG/M2

## 2021-09-28 DIAGNOSIS — U07.1 COVID-19: ICD-10-CM

## 2021-09-28 DIAGNOSIS — I10 ESSENTIAL HYPERTENSION: ICD-10-CM

## 2021-09-28 DIAGNOSIS — F10.10 ALCOHOL ABUSE: ICD-10-CM

## 2021-09-28 DIAGNOSIS — R06.02 SHORTNESS OF BREATH: Primary | ICD-10-CM

## 2021-09-28 DIAGNOSIS — R06.02 SHORTNESS OF BREATH: ICD-10-CM

## 2021-09-28 DIAGNOSIS — R55 SYNCOPE AND COLLAPSE: ICD-10-CM

## 2021-09-28 DIAGNOSIS — R00.2 PALPITATIONS: Primary | ICD-10-CM

## 2021-09-28 PROCEDURE — 99214 OFFICE O/P EST MOD 30 MIN: CPT | Performed by: INTERNAL MEDICINE

## 2021-09-28 PROCEDURE — 93000 ELECTROCARDIOGRAM COMPLETE: CPT | Performed by: INTERNAL MEDICINE

## 2021-09-28 PROCEDURE — 93225 XTRNL ECG REC<48 HRS REC: CPT | Performed by: INTERNAL MEDICINE

## 2021-09-28 NOTE — PROGRESS NOTES
· Constitutional:  No Fever or Weight Loss   · Eyes: No Decreased Vision  · ENT: No Headaches, Hearing Loss or Vertigo  · Cardiovascular: No Chest Pain,  No Shortness of breath, No Palpitations. No Edema   · Respiratory: No cough or wheezing . No Respiratory distress   · Gastrointestinal: No abdominal pain, appetite loss, blood in stools, constipation, diarrhea or heartburn  · Genitourinary: No dysuria, trouble voiding, or hematuria  · Musculoskeletal:  denies any new  joint aches , or pain   · Integumentary: No rash or pruritis  · Neurological: No TIA or stroke symptoms  · Psychiatric: No anxiety or depression  · Endocrine: No malaise, fatigue or temperature intolerance  · Hematologic/Lymphatic: No bleeding problems, blood clots or swollen lymph nodes  · Allergic/Immunologic: No nasal congestion or hives        Objective:      Physical Exam:    BP 98/70   Pulse 95   Ht 5' 4\" (1.626 m)   Wt 133 lb (60.3 kg)   BMI 22.83 kg/m²   Wt Readings from Last 3 Encounters:   09/28/21 133 lb (60.3 kg)   09/10/21 130 lb 4.7 oz (59.1 kg)   09/18/19 130 lb (59 kg)     Body mass index is 22.83 kg/m². Vitals:    09/28/21 1614   BP: 98/70   Pulse: 95        General Appearance and Constitutional: Conversant, Well developed, Well nourished, No acute distress, Non-toxic appearance. HEENT:  Normocephalic, Atraumatic, Bilateral external ears normal, Oropharynx moist, No oral exudates,   Nose normal.   Neck- Normal range of motion, No tenderness, Supple  Eyes:  EOMI, Conjunctiva normal, No discharge. Respiratory:  Normal breath sounds, No respiratory distress, No wheezing, No Rales, No Ronchi. No chest tenderness. Cardiovascular: S1-S2, no added heart sounds, No Mumurs appreciated. No gallops, rubs. No Pedal Edema   GI:  Bowel sounds normal, Soft, No tenderness,  :  No costovertebral angle tenderness   Musculoskeletal:  No gross deformities.  Back- No tenderness  Integument:  Well hydrated, no rash   Lymphatic:  No lymphadenopathy noted   Neurologic:  Alert & oriented x 3, Normal motor function, normal sensory function, no focal deficits noted   Psychiatric:  Speech and behavior appropriate       Medical decision making and Data review:    DATA:    Lab Results   Component Value Date    TROPONINT <0.010 09/08/2021     BNP:  No results found for: PROBNP  PT/INR:  No results found for: PTINR  No results found for: LABA1C  No results found for: CHOL, TRIG, HDL, LDLCALC, LDLDIRECT  Lab Results   Component Value Date    WBC 13.1 (H) 09/08/2021    HGB 14.6 09/08/2021    HCT 43.5 09/08/2021    .8 (H) 09/08/2021     (L) 09/08/2021     TSH: No results found for: TSH  Lab Results   Component Value Date     (H) 09/08/2021     (H) 09/08/2021    BILITOT 0.3 09/08/2021    ALKPHOS 83 09/08/2021         All labs, medications and tests reviewed by myself including data and history from outside source , patient and available family . 1. Shortness of breath    2. Essential hypertension    3. Syncope and collapse    4. Alcohol abuse    5. COVID-19         Impression and Plan:    1. Syncope:     likely vasovagal in the setting of viral infection, possible alcohol  intoxication. Echocardiogram shows mild LVH, EF 55%. Obtain 48 hours Holter monitor  stress MPI for risk stratification       2. Shortness of breath and fever likely secondary to COVID-19: Improved. Echo normal.  3. Recent COVID19 infection   4. Essential hypertension: Continue with Coreg  5. Alcohol dependence: Patient was counseled against alcohol use  6. Nicotine dependence and COPD: In exacerbation.  On IV steroids and breathing treatments      Return in about 1 month (around 10/28/2021). Counseled extensively and medication compliance urged. We discussed that for the  prevention of ASCVD our  goal is aggressive risk modification. Patient is encouraged to exercise even a brisk walk for 30 minutes  at least 3 to 4 times a week   Various goals were discussed and questions answered. Continue current medications. Appropriate prescriptions are addressed and refills ordered. Questions answered and patient verbalizes understanding. Call for any problems, questions, or concerns.

## 2021-09-28 NOTE — PATIENT INSTRUCTIONS
Please hold on to these instructions the  will call you within 1-9 business days when we receive authorization from your insurance. Nuclear Stress Test    WHAT TO EXPECT:   ? You will need to confirm the test or it could be cancelled. ? This test will take approximately 2 hours: 1 hour in the AM &    1 hour in the PM. You will be given a time by the Technologist after the first part is completed to come back. ? You will be given a medication, through an IV in the hand, this will safely simulate exercise. This IV is also needed to inject the radioactive isotope unless you are able toe walk the treadmill. ? You will receive an injection in the AM & PM before the pictures. ? Using a special camera, you will have one set of pictures of your heart taken in the AM and a set of pictures in the PM.     PREPARATION FOR TEST:  ? Eat a light breakfast such as water or juice and toast.  ? If you are DIABETIC: Eat a normal breakfast with NO CAFFEINE and take your insulin as normal.   ? AVOID ALL FOODS & DRINKS containing CAFFEINE 12 HOURS PRIOR TO THE TEST: Including coffee, Tea, Robin and other soft drinks even those labeled  caffeine free or decaffeinated.  ? HOLD THESE MEDICATIONS Persantine & Theophylline (Theodur)  24 hours prior & bring your inhaler with you.    The physician will specify if the following Beta blockers need to be held for 24 hours prior to test: Coreg (carvedilol)

## 2021-09-28 NOTE — PROGRESS NOTES
48 hour holter monitor [email or web address]  for Palpitations  Serial # [id number] . Instructed patient on monitor and proper use. Instructed on diary. When to remove and bring it back. Must leave the holter monitor on  without removing for the duration of time ordered. Answered all questions the patient had. [phone] with any questions or concerns with the monitor.

## 2021-10-01 ENCOUNTER — OFFICE VISIT (OUTPATIENT)
Dept: PULMONOLOGY | Age: 56
End: 2021-10-01
Payer: COMMERCIAL

## 2021-10-01 VITALS
HEIGHT: 64 IN | WEIGHT: 130 LBS | OXYGEN SATURATION: 94 % | HEART RATE: 113 BPM | DIASTOLIC BLOOD PRESSURE: 74 MMHG | BODY MASS INDEX: 22.2 KG/M2 | SYSTOLIC BLOOD PRESSURE: 108 MMHG

## 2021-10-01 DIAGNOSIS — F17.210 CIGARETTE SMOKER: ICD-10-CM

## 2021-10-01 DIAGNOSIS — J44.9 CHRONIC OBSTRUCTIVE PULMONARY DISEASE, UNSPECIFIED COPD TYPE (HCC): ICD-10-CM

## 2021-10-01 DIAGNOSIS — R06.02 SOB (SHORTNESS OF BREATH) ON EXERTION: ICD-10-CM

## 2021-10-01 PROCEDURE — 99214 OFFICE O/P EST MOD 30 MIN: CPT | Performed by: INTERNAL MEDICINE

## 2021-10-01 ASSESSMENT — SLEEP AND FATIGUE QUESTIONNAIRES
HOW LIKELY ARE YOU TO NOD OFF OR FALL ASLEEP IN A CAR, WHILE STOPPED FOR A FEW MINUTES IN TRAFFIC: 0
HOW LIKELY ARE YOU TO NOD OFF OR FALL ASLEEP WHILE SITTING INACTIVE IN A PUBLIC PLACE: 0
HOW LIKELY ARE YOU TO NOD OFF OR FALL ASLEEP WHILE LYING DOWN TO REST IN THE AFTERNOON WHEN CIRCUMSTANCES PERMIT: 0
HOW LIKELY ARE YOU TO NOD OFF OR FALL ASLEEP WHEN YOU ARE A PASSENGER IN A CAR FOR AN HOUR WITHOUT A BREAK: 2
NECK CIRCUMFERENCE (INCHES): 15
HOW LIKELY ARE YOU TO NOD OFF OR FALL ASLEEP WHILE WATCHING TV: 1
ESS TOTAL SCORE: 3
HOW LIKELY ARE YOU TO NOD OFF OR FALL ASLEEP WHILE SITTING AND TALKING TO SOMEONE: 0
HOW LIKELY ARE YOU TO NOD OFF OR FALL ASLEEP WHILE SITTING QUIETLY AFTER LUNCH WITHOUT ALCOHOL: 0
HOW LIKELY ARE YOU TO NOD OFF OR FALL ASLEEP WHILE SITTING AND READING: 0

## 2021-10-01 ASSESSMENT — ENCOUNTER SYMPTOMS
BACK PAIN: 0
EYE ITCHING: 0
EYE DISCHARGE: 0
SHORTNESS OF BREATH: 1
ABDOMINAL PAIN: 0
COUGH: 1
ABDOMINAL DISTENTION: 0

## 2021-10-01 NOTE — PROGRESS NOTES
Shoshanaradha Pierre  1965  Referring Provider: Dr. Tea Pacheco    Subjective:     Chief Complaint   Patient presents with    Follow-Up from Deyanira Ray is a 63 y.o. female has come back as a follow up. She was last seen in the hospital for the COVID pneumonia. She has h/o smoking 1.5 pk/day for about 40 yrs and she is still smoking 1/2 pk per day. She has cough, little phlegm-clear, no hemoptysis, no loss of weight, good appetite, SOB sometimes. She had no COVID vaccine but had flu vaccine last year. She had no PFT, no Low dose CT chest.    She snores and not sure if she stops breathing. She goes to bed at 9 PM and she gets up 5 AM and she is not tired. She is not sleepy during the day time. Current Outpatient Medications   Medication Sig Dispense Refill    albuterol sulfate  (90 Base) MCG/ACT inhaler Inhale 2 puffs into the lungs every 4 hours (while awake) 18 g 3    carvedilol (COREG) 6.25 MG tablet Take 1 tablet by mouth 2 times daily (with meals) 60 tablet 3    tiotropium-olodaterol (STIOLTO) 2.5-2.5 MCG/ACT AERS Inhale 2 puffs into the lungs daily 1 each 2    potassium chloride (KLOR-CON M) 20 MEQ extended release tablet Take 2 tablets by mouth 2 times daily (with meals) for 1 day (Patient not taking: Reported on 9/28/2021) 4 tablet 0     No current facility-administered medications for this visit.        No Known Allergies    Past Medical History:   Diagnosis Date    COVID-19 09/07/2021    Hypertension        Past Surgical History:   Procedure Laterality Date    CHOLECYSTECTOMY      DILATION AND CURETTAGE OF UTERUS      two of them       Social History     Socioeconomic History    Marital status:      Spouse name: None    Number of children: None    Years of education: None    Highest education level: None   Occupational History    None   Tobacco Use    Smoking status: Current Every Day Smoker     Packs/day: 1.00     Types: Cigarettes    Smokeless tobacco: Never Used   Substance and Sexual Activity    Alcohol use: Yes     Alcohol/week: 12.0 standard drinks     Types: 12 Cans of beer per week     Comment: 12 cans a day    Drug use: No    Sexual activity: None   Other Topics Concern    None   Social History Narrative    None     Social Determinants of Health     Financial Resource Strain:     Difficulty of Paying Living Expenses:    Food Insecurity:     Worried About Running Out of Food in the Last Year:     Ran Out of Food in the Last Year:    Transportation Needs:     Lack of Transportation (Medical):  Lack of Transportation (Non-Medical):    Physical Activity:     Days of Exercise per Week:     Minutes of Exercise per Session:    Stress:     Feeling of Stress :    Social Connections:     Frequency of Communication with Friends and Family:     Frequency of Social Gatherings with Friends and Family:     Attends Taoism Services:     Active Member of Clubs or Organizations:     Attends Club or Organization Meetings:     Marital Status:    Intimate Partner Violence:     Fear of Current or Ex-Partner:     Emotionally Abused:     Physically Abused:     Sexually Abused:        Review of Systems   Constitutional: Negative for fatigue. HENT: Negative for congestion and postnasal drip. Eyes: Negative for discharge and itching. Respiratory: Positive for cough and shortness of breath. Cardiovascular: Negative for chest pain and leg swelling. Gastrointestinal: Negative for abdominal distention and abdominal pain. Endocrine: Negative for cold intolerance and heat intolerance. Genitourinary: Negative for enuresis and frequency. Musculoskeletal: Negative for arthralgias and back pain. Allergic/Immunologic: Negative for environmental allergies and food allergies. Neurological: Negative for light-headedness and headaches. Hematological: Negative for adenopathy.    Psychiatric/Behavioral: Negative for agitation and behavioral problems. Objective:   /74   Pulse 113   Ht 5' 4\" (1.626 m)   Wt 130 lb (59 kg)   SpO2 94%   BMI 22.31 kg/m²   Body mass index is 22.31 kg/m². Sleep Medicine 10/1/2021   Sitting and reading 0   Watching TV 1   Sitting, inactive in a public place (e.g. a theatre or a meeting) 0   As a passenger in a car for an hour without a break 2   Lying down to rest in the afternoon when circumstances permit 0   Sitting and talking to someone 0   Sitting quietly after a lunch without alcohol 0   In a car, while stopped for a few minutes in traffic 0   Total score 3   Neck circumference 15     {MALLAMPATI:2    Physical Exam  Vitals reviewed. Constitutional:       Appearance: Normal appearance. HENT:      Head: Normocephalic and atraumatic. Nose: Nose normal.      Mouth/Throat:      Mouth: Mucous membranes are moist.   Eyes:      Extraocular Movements: Extraocular movements intact. Pupils: Pupils are equal, round, and reactive to light. Cardiovascular:      Rate and Rhythm: Normal rate and regular rhythm. Pulses: Normal pulses. Heart sounds: Normal heart sounds. Pulmonary:      Effort: Pulmonary effort is normal.      Breath sounds: Normal breath sounds. Abdominal:      General: Abdomen is flat. Palpations: Abdomen is soft. Musculoskeletal:         General: Normal range of motion. Cervical back: Normal range of motion and neck supple. Skin:     General: Skin is warm and dry. Neurological:      General: No focal deficit present. Mental Status: She is alert and oriented to person, place, and time.    Psychiatric:         Mood and Affect: Mood normal.         Behavior: Behavior normal.         Radiology: None    Assessment and Plan     Problem List        Pulmonary Problems    SOB (shortness of breath) on exertion      Advised to quit smoking  C.w Inhalers for now           Relevant Orders    Full PFT Study With Bronchodilator    6 Minute Walk Test    COPD (chronic obstructive pulmonary disease) ([facility] [address]      Advised to quit smoking  PFT  Low dose CT chest  Get yearly flu vaccine         Relevant Medications    albuterol sulfate  (90 Base) MCG/ACT inhaler    tiotropium-olodaterol (STIOLTO) 2.5-2.5 MCG/ACT AERS       Other    Cigarette smoker      Advised to quit smoking         Relevant Orders    CT LUNG SCREENING    Full PFT Study With Bronchodilator    6 Minute Walk Test               Follow-Up:    Return in about 4 weeks (around 10/29/2021) for PFT, 6 MWT, Low dose CT chest.     Progress notes sent to the referring Provider    Santo Robbins MD MD  10/1/2021  11:01 AM

## 2021-10-13 PROCEDURE — 93227 XTRNL ECG REC<48 HR R&I: CPT | Performed by: INTERNAL MEDICINE

## 2021-10-14 ENCOUNTER — PROCEDURE VISIT (OUTPATIENT)
Dept: CARDIOLOGY CLINIC | Age: 56
End: 2021-10-14
Payer: COMMERCIAL

## 2021-10-14 DIAGNOSIS — R06.02 SHORTNESS OF BREATH: ICD-10-CM

## 2021-10-14 DIAGNOSIS — R55 SYNCOPE AND COLLAPSE: ICD-10-CM

## 2021-10-14 LAB
LV EF: 63 %
LVEF MODALITY: NORMAL

## 2021-10-14 PROCEDURE — 78452 HT MUSCLE IMAGE SPECT MULT: CPT | Performed by: INTERNAL MEDICINE

## 2021-10-14 PROCEDURE — A9500 TC99M SESTAMIBI: HCPCS | Performed by: INTERNAL MEDICINE

## 2021-10-14 PROCEDURE — 93018 CV STRESS TEST I&R ONLY: CPT | Performed by: INTERNAL MEDICINE

## 2021-10-14 PROCEDURE — 93016 CV STRESS TEST SUPVJ ONLY: CPT | Performed by: INTERNAL MEDICINE

## 2021-10-14 PROCEDURE — 93017 CV STRESS TEST TRACING ONLY: CPT | Performed by: INTERNAL MEDICINE

## 2021-10-15 ENCOUNTER — TELEPHONE (OUTPATIENT)
Dept: CARDIOLOGY CLINIC | Age: 56
End: 2021-10-15

## 2021-10-15 NOTE — TELEPHONE ENCOUNTER
Summary    Supervising physician Dr. Yosvany Alejo .    No infarct or ischemia noted.    Normal tracer uptake in all segments of myocardium on stress and rest    images.        Normal EF 63 % with normal ventricular contractility.        Recommendation    Normal Lexiscan MPI    Medical Management         LM ON VOICEMAIL OF RESULTS

## 2021-10-20 DIAGNOSIS — Z01.811 PREOP PULMONARY/RESPIRATORY EXAM: Primary | ICD-10-CM

## 2021-10-28 ENCOUNTER — OFFICE VISIT (OUTPATIENT)
Dept: CARDIOLOGY CLINIC | Age: 56
End: 2021-10-28
Payer: COMMERCIAL

## 2021-10-28 VITALS
HEIGHT: 64 IN | WEIGHT: 131.6 LBS | DIASTOLIC BLOOD PRESSURE: 78 MMHG | SYSTOLIC BLOOD PRESSURE: 108 MMHG | HEART RATE: 96 BPM | BODY MASS INDEX: 22.47 KG/M2

## 2021-10-28 DIAGNOSIS — I10 ESSENTIAL HYPERTENSION: ICD-10-CM

## 2021-10-28 DIAGNOSIS — F17.210 CIGARETTE SMOKER: ICD-10-CM

## 2021-10-28 DIAGNOSIS — R55 SYNCOPE AND COLLAPSE: Primary | ICD-10-CM

## 2021-10-28 DIAGNOSIS — F10.10 ALCOHOL ABUSE: ICD-10-CM

## 2021-10-28 PROCEDURE — 99214 OFFICE O/P EST MOD 30 MIN: CPT | Performed by: INTERNAL MEDICINE

## 2021-10-28 NOTE — LETTER
Ranulfo Fonseca  1965  [id number]    Have you had any Chest Pain that is not new? - No       DO EKG IF: Patient has a Heart Rate above 100 or below 40     CAD (Coronary Artery Disease) patient should have one on file every 6 months        Have you had any Shortness of Breath - Yes  If Yes - When at rest and on exertion    Have you had any dizziness - No     Sitting wait 5 minutes do supine (laying down) wait 5 minutes then do standing - log each in \"vitals\" area in Epic   Be sure to ask what symptoms they are having if they get dizzy while completing ortho stats such as: room spinning, nausea, etc.    Have you had any palpitations that are not new? - No      Is the patient on any of the following medications - No  If Yes DO EKG - Needs done every 6 months    Do you have any edema - swelling in ankles    If Yes - CHECK TO SEE IF THE EDEMA IS PITTING  How long have they been having edema - Weeks  If Yes - Have they worn compression stockings No      Vein \"LEG PROBLEM Questionnaire\"  1. Do you have prominent leg veins? Yes   2. Do you have any skin discoloration? No  3. Do you have any healed or active sores? No  4. Do you have swelling of the legs? No  5. Do you have a family history of varicose veins? Yes  6. Does your profession involve pro-longed        standing or heavy lifting? No  7. Have you been fighting overweight problems? No  8. Do you have restless legs? No  9. Do you have any night time cramps? Yes  10. Do you have any of the following in your legs: No             When did you have your last labs drawn 09/10/2021  Where did you have them done [address] doctor ordered     If we do not have these labs you are retrieve these labs for these providers!     Do you have a surgery or procedure scheduled in the near future - No       Ask patient if they [facility] to [facility] if they are not already signed up     Check to see if we have an E-MAIL on file for the patient     Check medication list thoroughly!!! AND RECONCILE OUTSIDE MEDICATIONS  If dose has changed change the entire order not just the MG  BE SURE TO ASK PATIENT IF THEY NEED MEDICATION REFILLS     At check out add to every patient's \"wrap up\" the following dot phrase AFTERHOURSEDUCATION and ensure we explain this to our patients

## 2021-10-28 NOTE — PROGRESS NOTES
Avelino Mejia MD                                  CARDIOLOGY  NOTE      Chief Complaint:    No chief complaint on file. Follow-up from hospital  Patient evaluated for syncope    HOLTER  MONITOR 10/5/2021     Patient Name: Blade Weinberg   Medical Record Number: [id number]  Date: 10/13/2021     63 y.o. 1965     INDICATIONS:   Palpitations      FINDINGS:     The patient had predominantly sinus rhythm. Heart rate varied from 79 bpm to 127 bpm.  The patient's average heart rate was 98 bpm.    The patient had a holter monitor tracing done for  48 hrs  The patient had 597 ventricular ectopic activity. The patient had 3 supraventricular ectopy.    No other major arrhythmia was noted. No diary attached.     CONCLUSION:       Normal Sinus Rhythm  No Atrial Fibrillation noted  Rare PVCs noted   Benign Holter tracing report.      RECOMMENDATIONS:       Medical Management  Outpatient follow up       [facility]  10/14/2021    [facility] physician Dr. Luciano Headley .    No infarct or ischemia noted.    Normal tracer uptake in all segments of myocardium on stress and rest    images.        Normal EF 63 % with normal ventricular contractility.        Recommendation    Normal [facility]    Medical Management     Echo  9/8/2021        Summary   This is a limited echocardiogram.   Expedited imaging was used to obtain protocol images to reduce the   sonographer's exposure during COVID-19 pandemic. Left ventricular systolic function is normal.   Ejection fraction is visually estimated at 55%. Mild left ventricular hypertrophy. No evidence of any pericardial effusion. HPI:     Renato Francis is a 63y.o. year old female who was recently evaluated in the hospital for syncope. Differential diagnosis evaluated included vasovagal syncope in the setting of COVID-19 infection versus possible alcohol intoxication.     Patient was admitted in the hospital with COVID-19 and received IV antibiotics steroids and supplemental oxygen. EKG shows sinus rhythm without any ischemic changes        Current Outpatient Medications   Medication Sig Dispense Refill    albuterol sulfate  (90 Base) MCG/ACT inhaler Inhale 2 puffs into the lungs every 4 hours (while awake) 18 g 3    carvedilol (COREG) 6.25 MG tablet Take 1 tablet by mouth 2 times daily (with meals) (Patient not taking: Reported on 10/28/2021) 60 tablet 3    potassium chloride (KLOR-CON M) 20 MEQ extended release tablet Take 2 tablets by mouth 2 times daily (with meals) for 1 day (Patient not taking: Reported on 9/28/2021) 4 tablet 0    tiotropium-olodaterol (STIOLTO) 2.5-2.5 MCG/ACT AERS Inhale 2 puffs into the lungs daily (Patient not taking: Reported on 10/28/2021) 1 each 2     No current facility-administered medications for this visit. Allergies:     Patient has no known allergies. Patient History:    Past Medical History:   Diagnosis Date    COVID-19 09/07/2021    History of stress test 10/14/2021    Normal EF 63 % with normal ventricular contractility.  Hypertension      Past Surgical History:   Procedure Laterality Date    CHOLECYSTECTOMY      DILATION AND CURETTAGE OF UTERUS      two of them     History reviewed. No pertinent family history. Social History     Tobacco Use    Smoking status: Current Every Day Smoker     Packs/day: 1.00     Types: Cigarettes    Smokeless tobacco: Never Used   Substance Use Topics    Alcohol use: Yes     Alcohol/week: 12.0 standard drinks     Types: 12 Cans of beer per week     Comment: 12 cans a day        Review of Systems:     · Constitutional:  No Fever or Weight Loss   · Eyes: No Decreased Vision  · ENT: No Headaches, Hearing Loss or Vertigo  · Cardiovascular: No Chest Pain,  No Shortness of breath, No Palpitations. No Edema   · Respiratory: No cough or wheezing .  No Respiratory distress   · Gastrointestinal: No abdominal pain, appetite loss, blood in stools, constipation, diarrhea or heartburn  · Genitourinary: No dysuria, trouble voiding, or hematuria  · Musculoskeletal:  denies any new  joint aches , or pain   · Integumentary: No rash or pruritis  · Neurological: No TIA or stroke symptoms  · Psychiatric: No anxiety or depression  · Endocrine: No malaise, fatigue or temperature intolerance  · Hematologic/Lymphatic: No bleeding problems, blood clots or swollen lymph nodes  · Allergic/Immunologic: No nasal congestion or hives        Objective:      Physical Exam:    /78   Pulse 96   Ht 5' 4\" (1.626 m)   Wt 131 lb 9.6 oz (59.7 kg)   BMI 22.59 kg/m²   Wt Readings from Last 3 Encounters:   10/28/21 131 lb 9.6 oz (59.7 kg)   10/01/21 130 lb (59 kg)   09/28/21 133 lb (60.3 kg)     Body mass index is 22.59 kg/m². Vitals:    10/28/21 1550   BP: 108/78   Pulse: 96        General Appearance and Constitutional: Conversant, Well developed, Well nourished, No acute distress, Non-toxic appearance. HEENT:  Normocephalic, Atraumatic, Bilateral external ears normal, Oropharynx moist, No oral exudates,   Nose normal.   Neck- Normal range of motion, No tenderness, Supple  Eyes:  EOMI, Conjunctiva normal, No discharge. Respiratory:  Normal breath sounds, No respiratory distress, No wheezing, No Rales, No Ronchi. No chest tenderness. Cardiovascular: S1-S2, no added heart sounds, No Mumurs appreciated. No gallops, rubs. No Pedal Edema   GI:  Bowel sounds normal, Soft, No tenderness,  :  No costovertebral angle tenderness   Musculoskeletal:  No gross deformities.  Back- No tenderness  Integument:  Well hydrated, no rash   Lymphatic:  No lymphadenopathy noted   Neurologic:  Alert & oriented x 3, Normal motor function, normal sensory function, no focal deficits noted   Psychiatric:  Speech and behavior appropriate       Medical decision making and Data review:    DATA:    Lab Results   Component Value Date    TROPONINT <0.010 09/08/2021     BNP:  No results found for: PROBNP  PT/INR:  No results found for: PTINR  No results found for: LABA1C  No results found for: CHOL, TRIG, HDL, LDLCALC, LDLDIRECT  Lab Results   Component Value Date    WBC 13.1 (H) 09/08/2021    HGB 14.6 09/08/2021    HCT 43.5 09/08/2021    .8 (H) 09/08/2021     (L) 09/08/2021     TSH: No results found for: TSH  Lab Results   Component Value Date     (H) 09/08/2021     (H) 09/08/2021    BILITOT 0.3 09/08/2021    ALKPHOS 83 09/08/2021         All labs, medications and tests reviewed by myself including data and history from outside source , patient and available family . 1. Syncope and collapse    2. Essential hypertension    3. Alcohol abuse    4. Cigarette smoker         Impression and Plan:    1. Syncope:     likely vasovagal in the setting of viral infection, possible alcohol  intoxication. Echocardiogram shows mild LVH, EF 55%. 48 hours Holter monitor: NSR, no sustained arrhythmias. Rare PVCs noted  stress MPI for risk stratification: Negative for ischemia. No further episodes of syncope or presyncope  No further work-up planned at this point       2. Shortness of breath and fever likely secondary to COVID-19: Improved. Echo normal.  3. Recent COVID19 infection   4. Essential hypertension: Patient quit taking Coreg as she was hypotensive at home. Her blood pressure is 108/78. Okay to be off antihypertensive. Recommend low-salt diet  5. Alcohol dependence: Patient was counseled against alcohol use  6. Nicotine dependence and COPD: In exacerbation.  On IV steroids and breathing treatments      Return in about 1 year (around 10/28/2022). Counseled extensively and medication compliance urged. We discussed that for the  prevention of ASCVD our  goal is aggressive risk modification. Patient is encouraged to exercise even a brisk walk for 30 minutes  at least 3 to 4 times a week   Various goals were discussed and questions answered. Continue current medications.  Appropriate prescriptions are addressed and refills ordered. Questions answered and patient verbalizes understanding. Call for any problems, questions, or concerns.

## 2021-11-10 ENCOUNTER — HOSPITAL ENCOUNTER (OUTPATIENT)
Dept: LAB | Age: 56
Discharge: HOME | End: 2021-11-10
Payer: COMMERCIAL

## 2021-11-10 PROCEDURE — U0003 INFECTIOUS AGENT DETECTION BY NUCLEIC ACID (DNA OR RNA); SEVERE ACUTE RESPIRATORY SYNDROME CORONAVIRUS 2 (SARS-COV-2) (CORONAVIRUS DISEASE [COVID-19]), AMPLIFIED PROBE TECHNIQUE, MAKING USE OF HIGH THROUGHPUT TECHNOLOGIES AS DESCRIBED BY CMS-2020-01-R: HCPCS

## 2021-11-10 PROCEDURE — U0005 INFEC AGEN DETEC AMPLI PROBE: HCPCS

## 2021-11-11 LAB
SARS-COV-2: NOT DETECTED
SOURCE: NORMAL

## 2021-11-15 ENCOUNTER — HOSPITAL ENCOUNTER (OUTPATIENT)
Dept: CT IMAGING | Age: 56
Discharge: HOME | End: 2021-11-15
Payer: COMMERCIAL

## 2021-11-15 ENCOUNTER — HOSPITAL ENCOUNTER (OUTPATIENT)
Dept: PULMONOLOGY | Age: 56
Discharge: HOME | End: 2021-11-15
Payer: COMMERCIAL

## 2021-11-15 DIAGNOSIS — F17.210 CIGARETTE SMOKER: ICD-10-CM

## 2021-11-15 DIAGNOSIS — R06.02 SOB (SHORTNESS OF BREATH) ON EXERTION: ICD-10-CM

## 2021-11-15 NOTE — PROGRESS NOTES
[facility] Pulmonary Function Lab - Six Minute Walk  Test Performed on: Room Air_____ Oxygen at _____ lpm via N/C  Assist Device Used During Test:    None____ Cane____ Walker___   Shortness of Breath - Stephanie's Scale  0 Nothing at all 5 Severe    0.5 Very very slight 6    1 Very slight 7 Very severe   2 Slight 8     3 Moderate 9 Very very severe   4 Somewhat severe 10 Maximal      Time SpO2 Heart Rate Respiratory Rate Modified Stephanies Scale Other      Baseline                 %         PRE                1 minute                             %                       2 minutes                       %                       3 minutes                        %                          4 minutes                      %                           5 minutes                  %                           6 minutes                     %                       Recovery   x 1 Min                          %           POST             Recovery   x 2 Min                           %                          Number of Laps_____ X 170 feet _____+ ___ additional feet = Total ____feet  Stopped or paused before 6 minutes?  No____ Yes _____   Pre Blood Pressure: __ / ___    Post Blood Pressure:_  _/___  Interpretation:

## 2021-11-15 NOTE — PROGRESS NOTES
Pt canceled and will reschedule. Pt does not feel well. Pt said, \"I feel like I am going to pass out\".

## 2021-12-10 ENCOUNTER — HOSPITAL ENCOUNTER (OUTPATIENT)
Dept: LAB | Age: 56
Discharge: HOME | End: 2021-12-10
Payer: COMMERCIAL

## 2021-12-10 LAB
SARS-COV-2: NOT DETECTED
SOURCE: NORMAL

## 2021-12-10 PROCEDURE — U0005 INFEC AGEN DETEC AMPLI PROBE: HCPCS

## 2021-12-10 PROCEDURE — U0003 INFECTIOUS AGENT DETECTION BY NUCLEIC ACID (DNA OR RNA); SEVERE ACUTE RESPIRATORY SYNDROME CORONAVIRUS 2 (SARS-COV-2) (CORONAVIRUS DISEASE [COVID-19]), AMPLIFIED PROBE TECHNIQUE, MAKING USE OF HIGH THROUGHPUT TECHNOLOGIES AS DESCRIBED BY CMS-2020-01-R: HCPCS

## 2021-12-13 ENCOUNTER — HOSPITAL ENCOUNTER (OUTPATIENT)
Dept: PULMONOLOGY | Age: 56
Discharge: HOME | End: 2021-12-13
Payer: COMMERCIAL

## 2021-12-13 ENCOUNTER — HOSPITAL ENCOUNTER (OUTPATIENT)
Dept: CT IMAGING | Age: 56
Discharge: HOME | End: 2021-12-13
Payer: COMMERCIAL

## 2021-12-13 LAB
DISTANCE WALKED: 790 FT
DLCO %PRED: 66 %
DLCO PRED: NORMAL
DLCO/VA %PRED: NORMAL
DLCO/VA PRED: NORMAL
DLCO/VA: NORMAL
DLCO: NORMAL
EXPIRATORY TIME-POST: NORMAL
EXPIRATORY TIME: NORMAL
FEF 25-75% %CHNG: NORMAL
FEF 25-75% %PRED-POST: NORMAL
FEF 25-75% %PRED-PRE: NORMAL
FEF 25-75% PRED: NORMAL
FEF 25-75%-POST: NORMAL
FEF 25-75%-PRE: NORMAL
FEV1 %PRED-POST: 58 %
FEV1 %PRED-PRE: 49 %
FEV1 PRED: NORMAL
FEV1-POST: NORMAL
FEV1-PRE: NORMAL
FEV1/FVC %PRED-POST: NORMAL
FEV1/FVC %PRED-PRE: NORMAL
FEV1/FVC PRED: NORMAL
FEV1/FVC-POST: 51 %
FEV1/FVC-PRE: 48 %
FVC %PRED-POST: NORMAL
FVC %PRED-PRE: NORMAL
FVC PRED: NORMAL
FVC-POST: NORMAL
FVC-PRE: NORMAL
GAW %PRED: NORMAL
GAW PRED: NORMAL
GAW: NORMAL
IC %PRED: NORMAL
IC PRED: NORMAL
IC: NORMAL
MEP: NORMAL
MIP: NORMAL
MVV %PRED-PRE: NORMAL
MVV PRED: NORMAL
MVV-PRE: NORMAL
PEF %PRED-POST: NORMAL
PEF %PRED-PRE: NORMAL
PEF PRED: NORMAL
PEF%CHNG: NORMAL
PEF-POST: NORMAL
PEF-PRE: NORMAL
RAW %PRED: NORMAL
RAW PRED: NORMAL
RAW: NORMAL
RV %PRED: NORMAL
RV PRED: NORMAL
RV: NORMAL
SPO2: 94 %
SVC %PRED: NORMAL
SVC PRED: NORMAL
SVC: NORMAL
TLC %PRED: 140 %
TLC PRED: NORMAL
TLC: NORMAL
VA %PRED: NORMAL
VA PRED: NORMAL
VA: NORMAL
VTG %PRED: NORMAL
VTG PRED: NORMAL
VTG: NORMAL

## 2021-12-13 PROCEDURE — 94726 PLETHYSMOGRAPHY LUNG VOLUMES: CPT

## 2021-12-13 PROCEDURE — 94618 PULMONARY STRESS TESTING: CPT

## 2021-12-13 PROCEDURE — 71271 CT THORAX LUNG CANCER SCR C-: CPT

## 2021-12-13 PROCEDURE — 94729 DIFFUSING CAPACITY: CPT

## 2021-12-13 PROCEDURE — 94060 EVALUATION OF WHEEZING: CPT

## 2021-12-13 ASSESSMENT — PULMONARY FUNCTION TESTS
FEV1/FVC_POST: 51
FEV1_PERCENT_PREDICTED_POST: 58
FEV1_PERCENT_PREDICTED_PRE: 49
FEV1/FVC_PRE: 48

## 2021-12-13 NOTE — PROGRESS NOTES
[facility] Pulmonary Function Lab - Six Minute Walk  Test Performed on: Room Air___X_ Oxygen at _____ lpm via N/C  Assist Device Used During Test:    None_X__ Cane____ Walker___   Shortness of Breath - Stephanie's Scale  0 Nothing at all 5 Severe    0.5 Very very slight 6    1 Very slight 7 Very severe   2 Slight 8     3 Moderate 9 Very very severe   4 Somewhat severe 10 Maximal      Time SpO2 Heart Rate Respiratory Rate Modified Stephanies Scale Other      Baseline     96            %  94 12 0           1 minute                 96            % 101   1           2 minutes           94            %  104  2           3 minutes           95             %  104     2           4 minutes          95            %  104      2           5 minutes     95           %  107      3           6 minutes        94             %  106     3        Recovery   x 1 Min             96             %  103 16 2        Recovery   x 2 Min              94             %  107       1         Number of Laps_4__ X 170 feet 680__+ 110_ additional feet = Total _790_ft  Stopped or paused before 6 minutes?  No_X__ Yes _____   Pre Blood Pressure: _144_ /93__    Post Blood Pressure:_154/97_  Interpretation:

## 2021-12-17 ENCOUNTER — TELEPHONE (OUTPATIENT)
Dept: FAMILY MEDICINE CLINIC | Age: 56
End: 2021-12-17

## 2021-12-17 NOTE — TELEPHONE ENCOUNTER
----- Message from Socorro Mandel sent at 12/15/2021 12:45 PM EST -----  Subject: Message to Provider    QUESTIONS  Information for Provider? Patient would like to switch appointments with   her ? Bridget Beltrán, 's appointment is on 12/30/2021, Patient's   appointment is on 2/29/2021. Please call patient. Thank you.  ---------------------------------------------------------------------------  --------------  CALL BACK INFO  What is the best way for the office to contact you? OK to leave message on   voicemail  Preferred Call Back Phone Number? [phone]  ---------------------------------------------------------------------------  --------------  SCRIPT ANSWERS  Relationship to Patient?  Self

## 2021-12-17 NOTE — TELEPHONE ENCOUNTER
Patient would like to switch appointments with   her ? Renetta Mcguire, 's appointment is on 12/30/2021, Patient's   appointment is on 2/29/2021. Please call patient.  Thank you

## 2021-12-29 ENCOUNTER — OFFICE VISIT (OUTPATIENT)
Dept: FAMILY MEDICINE CLINIC | Age: 56
End: 2021-12-29
Payer: COMMERCIAL

## 2021-12-29 VITALS
HEIGHT: 64 IN | WEIGHT: 132.8 LBS | OXYGEN SATURATION: 98 % | HEART RATE: 106 BPM | SYSTOLIC BLOOD PRESSURE: 160 MMHG | BODY MASS INDEX: 22.67 KG/M2 | DIASTOLIC BLOOD PRESSURE: 100 MMHG

## 2021-12-29 DIAGNOSIS — E87.1 HYPONATREMIA: ICD-10-CM

## 2021-12-29 DIAGNOSIS — Z72.0 TOBACCO ABUSE: ICD-10-CM

## 2021-12-29 DIAGNOSIS — J44.9 CHRONIC OBSTRUCTIVE PULMONARY DISEASE, UNSPECIFIED COPD TYPE (HCC): Primary | ICD-10-CM

## 2021-12-29 DIAGNOSIS — R73.9 HYPERGLYCEMIA: ICD-10-CM

## 2021-12-29 DIAGNOSIS — F41.9 ANXIETY: ICD-10-CM

## 2021-12-29 DIAGNOSIS — I10 PRIMARY HYPERTENSION: ICD-10-CM

## 2021-12-29 DIAGNOSIS — K21.9 GASTROESOPHAGEAL REFLUX DISEASE WITHOUT ESOPHAGITIS: ICD-10-CM

## 2021-12-29 DIAGNOSIS — Z13.220 LIPID SCREENING: ICD-10-CM

## 2021-12-29 DIAGNOSIS — Z12.11 COLON CANCER SCREENING: ICD-10-CM

## 2021-12-29 DIAGNOSIS — Z12.31 ENCOUNTER FOR SCREENING MAMMOGRAM FOR BREAST CANCER: ICD-10-CM

## 2021-12-29 DIAGNOSIS — F10.10 ALCOHOL ABUSE: ICD-10-CM

## 2021-12-29 LAB
A/G RATIO: 1.3 (ref 1.1–2.2)
ALBUMIN SERPL-MCNC: 4.1 G/DL (ref 3.4–5)
ALP BLD-CCNC: 166 U/L (ref 40–129)
ALT SERPL-CCNC: 38 U/L (ref 10–40)
ANION GAP SERPL CALCULATED.3IONS-SCNC: 17 MMOL/L (ref 3–16)
ANISOCYTOSIS: ABNORMAL
AST SERPL-CCNC: 58 U/L (ref 15–37)
BASOPHILS ABSOLUTE: 0 K/UL (ref 0–0.2)
BASOPHILS RELATIVE PERCENT: 0.4 %
BILIRUB SERPL-MCNC: 0.5 MG/DL (ref 0–1)
BUN BLDV-MCNC: 3 MG/DL (ref 7–20)
CALCIUM SERPL-MCNC: 9.9 MG/DL (ref 8.3–10.6)
CHLORIDE BLD-SCNC: 97 MMOL/L (ref 99–110)
CHOLESTEROL, TOTAL: 170 MG/DL (ref 0–199)
CO2: 25 MMOL/L (ref 21–32)
CREAT SERPL-MCNC: <0.5 MG/DL (ref 0.6–1.1)
EOSINOPHILS ABSOLUTE: 0 K/UL (ref 0–0.6)
EOSINOPHILS RELATIVE PERCENT: 0.3 %
FOLATE: 2.99 NG/ML (ref 4.78–24.2)
GFR AFRICAN AMERICAN: >60
GFR NON-AFRICAN AMERICAN: >60
GLUCOSE FASTING: 103 MG/DL (ref 70–99)
HCT VFR BLD CALC: 51 % (ref 36–48)
HDLC SERPL-MCNC: 46 MG/DL (ref 40–60)
HEMATOLOGY PATH CONSULT: YES
HEMOGLOBIN: 17.3 G/DL (ref 12–16)
LDL CHOLESTEROL CALCULATED: 103 MG/DL
LYMPHOCYTES ABSOLUTE: 1.1 K/UL (ref 1–5.1)
LYMPHOCYTES RELATIVE PERCENT: 13 %
MCH RBC QN AUTO: 40.9 PG (ref 26–34)
MCHC RBC AUTO-ENTMCNC: 33.9 G/DL (ref 31–36)
MCV RBC AUTO: 120.7 FL (ref 80–100)
MONOCYTES ABSOLUTE: 0.8 K/UL (ref 0–1.3)
MONOCYTES RELATIVE PERCENT: 9.5 %
NEUTROPHILS ABSOLUTE: 6.5 K/UL (ref 1.7–7.7)
NEUTROPHILS RELATIVE PERCENT: 76.8 %
PDW BLD-RTO: 17.2 % (ref 12.4–15.4)
PLATELET # BLD: 275 K/UL (ref 135–450)
PMV BLD AUTO: 8.5 FL (ref 5–10.5)
POLYCHROMASIA: ABNORMAL
POTASSIUM SERPL-SCNC: 3.8 MMOL/L (ref 3.5–5.1)
RBC # BLD: 4.23 M/UL (ref 4–5.2)
SODIUM BLD-SCNC: 139 MMOL/L (ref 136–145)
T4 FREE: 1.2 NG/DL (ref 0.9–1.8)
TOTAL PROTEIN: 7.2 G/DL (ref 6.4–8.2)
TRIGL SERPL-MCNC: 106 MG/DL (ref 0–150)
TSH SERPL DL<=0.05 MIU/L-ACNC: 1.01 UIU/ML (ref 0.27–4.2)
VITAMIN B-12: 296 PG/ML (ref 211–911)
VITAMIN D 25-HYDROXY: 13.4 NG/ML
VLDLC SERPL CALC-MCNC: 21 MG/DL
WBC # BLD: 8.4 K/UL (ref 4–11)

## 2021-12-29 PROCEDURE — 99203 OFFICE O/P NEW LOW 30 MIN: CPT | Performed by: FAMILY MEDICINE

## 2021-12-29 RX ORDER — OMEPRAZOLE AND SODIUM BICARBONATE 40; 1100 MG/1; MG/1
CAPSULE ORAL
COMMUNITY
End: 2021-12-29

## 2021-12-29 RX ORDER — OMEPRAZOLE 40 MG/1
40 CAPSULE, DELAYED RELEASE ORAL DAILY
COMMUNITY
End: 2022-01-14 | Stop reason: SDUPTHER

## 2021-12-29 RX ORDER — CARVEDILOL 3.12 MG/1
3.12 TABLET ORAL 2 TIMES DAILY WITH MEALS
Qty: 60 TABLET | Refills: 5 | Status: SHIPPED | OUTPATIENT
Start: 2021-12-29

## 2021-12-29 ASSESSMENT — ENCOUNTER SYMPTOMS
COUGH: 0
SINUS PRESSURE: 0
CONSTIPATION: 0
ABDOMINAL PAIN: 0
DIARRHEA: 0
SORE THROAT: 0
CHEST TIGHTNESS: 0
WHEEZING: 0
SHORTNESS OF BREATH: 0
BACK PAIN: 0

## 2021-12-29 ASSESSMENT — PATIENT HEALTH QUESTIONNAIRE - PHQ9
SUM OF ALL RESPONSES TO PHQ QUESTIONS 1-9: 0
2. FEELING DOWN, DEPRESSED OR HOPELESS: 0
1. LITTLE INTEREST OR PLEASURE IN DOING THINGS: 0
SUM OF ALL RESPONSES TO PHQ9 QUESTIONS 1 & 2: 0

## 2021-12-29 NOTE — PROGRESS NOTES
Slim Eric  1965 12/29/21    Chief Complaint   Patient presents with    New Patient     Patient here to establish care with PCP           61 yrs old lady with PMH of COPD, GERD, and anxiety, came today to establish with us, patient was having syncope and colapse and had work up last September and were fine. She dose drink  alcohol and dose smoke tobacco. Not on any medication for her anxiety, used to be on carvidolol since last September, but stopped taking that because make her legs swollen      Past Medical History:   Diagnosis Date    COVID-19 09/07/2021    History of stress test 10/14/2021    Normal EF 63 % with normal ventricular contractility.  Hypertension      Past Surgical History:   Procedure Laterality Date    CHOLECYSTECTOMY      DILATION AND CURETTAGE OF UTERUS      two of them     History reviewed. No pertinent family history. Social History     Socioeconomic History    Marital status:      Spouse name: Not on file    Number of children: Not on file    Years of education: Not on file    Highest education level: Not on file   Occupational History    Not on file   Tobacco Use    Smoking status: Current Every Day Smoker     Packs/day: 1.00     Types: Cigarettes    Smokeless tobacco: Never Used   Substance and Sexual Activity    Alcohol use: Yes     Alcohol/week: 12.0 standard drinks     Types: 12 Cans of beer per week     Comment: 12 cans a day    Drug use: No    Sexual activity: Not on file   Other Topics Concern    Not on file   Social History Narrative    Not on file     Social Determinants of Health     Financial Resource Strain:     Difficulty of Paying Living Expenses: Not on file   Food Insecurity:     Worried About Running Out of Food in the Last Year: Not on file    Shana of Food in the Last Year: Not on file   Transportation Needs:     Lack of Transportation (Medical): Not on file    Lack of Transportation (Non-Medical):  Not on file   Physical Activity:     Days of Exercise per Week: Not on file    Minutes of Exercise per Session: Not on file   Stress:     Feeling of Stress : Not on file   Social Connections:     Frequency of Communication with Friends and Family: Not on file    Frequency of Social Gatherings with Friends and Family: Not on file    Attends Caodaism Services: Not on file    Active Member of [address] [place] [address] or Organizations: Not on file    Attends Club or Organization Meetings: Not on file    Marital Status: Not on file   Intimate Partner Violence:     Fear of Current or Ex-Partner: Not on file    Emotionally Abused: Not on file    Physically Abused: Not on file    Sexually Abused: Not on file   Housing Stability:     Unable to Pay for Housing in the Last Year: Not on file    Number of [place] in the Last Year: Not on file    Unstable Housing in the Last Year: Not on file       No Known Allergies  Current Outpatient Medications   Medication Sig Dispense Refill    omeprazole (PRILOSEC) 40 MG delayed release capsule Take 40 mg by mouth daily      carvedilol (COREG) 3.125 MG tablet Take 1 tablet by mouth 2 times daily (with meals) 60 tablet 5    albuterol sulfate  (90 Base) MCG/ACT inhaler Inhale 2 puffs into the lungs every 4 hours (while awake) (Patient not taking: Reported on 12/29/2021) 18 g 3     No current facility-administered medications for this visit. Review of Systems   Constitutional: Negative for activity change, appetite change, chills, fatigue and fever. HENT: Negative for congestion, postnasal drip, sinus pressure and sore throat. Respiratory: Negative for cough, chest tightness, shortness of breath and wheezing. Cardiovascular: Negative for chest pain and leg swelling. Gastrointestinal: Negative for abdominal pain, constipation and diarrhea. Genitourinary: Negative for dysuria and frequency. Musculoskeletal: Negative for back pain and gait problem. Skin: Negative for rash. Neurological: Negative for dizziness, weakness and headaches. Psychiatric/Behavioral: Positive for agitation. Negative for behavioral problems and sleep disturbance. The patient is nervous/anxious. Lab Results   Component Value Date    WBC 13.1 (H) 09/08/2021    HGB 14.6 09/08/2021    HCT 43.5 09/08/2021    .8 (H) 09/08/2021     (L) 09/08/2021     Lab Results   Component Value Date     (L) 09/10/2021    K 3.7 09/10/2021    CL 95 (L) 09/10/2021    CO2 27 09/10/2021    BUN 7 09/10/2021    CREATININE 0.3 (L) 09/10/2021    GLUCOSE 148 (H) 09/10/2021    CALCIUM 8.7 09/10/2021    PROT 5.9 (L) 09/08/2021    LABALBU 3.3 (L) 09/08/2021    BILITOT 0.3 09/08/2021    ALKPHOS 83 09/08/2021     (H) 09/08/2021     (H) 09/08/2021    LABGLOM >60 09/10/2021    GFRAA >60 09/10/2021     No results found for: CHOL  No results found for: TRIG  No results found for: HDL  No results found for: LDLCALC, LDLCHOLESTEROL  No results found for: LABA1C  Lab Results   Component Value Date    TSHHS 0.851 05/04/2013         BP (!) 160/100   Pulse 106   Ht 5' 4\" (1.626 m)   Wt 132 lb 12.8 oz (60.2 kg)   SpO2 98%   BMI 22.80 kg/m²     BP Readings from Last 3 Encounters:   12/29/21 (!) 160/100   10/28/21 108/78   10/01/21 108/74       Wt Readings from Last 3 Encounters:   12/29/21 132 lb 12.8 oz (60.2 kg)   10/28/21 131 lb 9.6 oz (59.7 kg)   10/01/21 130 lb (59 kg)         Physical Exam  Constitutional:       General: She is not in acute distress. Appearance: Normal appearance. She is well-developed. She is not ill-appearing or diaphoretic. HENT:      Head: Normocephalic and atraumatic. Eyes:      General: No scleral icterus. Pupils: Pupils are equal, round, and reactive to light. Cardiovascular:      Rate and Rhythm: Normal rate and regular rhythm. Heart sounds: Normal heart sounds. No murmur heard.       Pulmonary:      Effort: Pulmonary effort is normal.      Breath sounds: Normal breath sounds. No wheezing. Abdominal:      General: There is no distension. Palpations: Abdomen is soft. There is no mass. Musculoskeletal:         General: Normal range of motion. Cervical back: Normal range of motion and neck supple. No rigidity. Right lower leg: No edema. Left lower leg: No edema. Skin:     Findings: No rash. Neurological:      General: No focal deficit present. Mental Status: She is alert and oriented to person, place, and time. Psychiatric:         Mood and Affect: Mood normal.         Behavior: Behavior normal.         Thought Content: Thought content normal.         Judgment: Judgment normal.         ASSESSMENT/ PLAN:    1. Chronic obstructive pulmonary disease, unspecified COPD type ([facility] [address]  - stable    2. Primary hypertension  - elevated, will put back on carvedilol but less dose  - carvedilol (COREG) 3.125 MG tablet; Take 1 tablet by mouth 2 times daily (with meals)  Dispense: 60 tablet; Refill: 5    3. Gastroesophageal reflux disease without esophagitis  - stable  - omeprazole (PRILOSEC) 40 MG delayed release capsule; Take 40 mg by mouth daily    4. Anxiety  - no medication, stable  - TSH without Reflex; Future  - T4, Free; Future    5. Alcohol abuse  - cut down drinking    6. Tobacco abuse  - encourage smoking cessation    7. Lipid screening  - Lipid Panel; Future    8. Hyponatremia  - Comprehensive Metabolic Panel, Fasting; Future  - Vitamin B12 & Folate; Future  - Vitamin D 25 Hydroxy; Future  - CBC Auto Differential; Future    9. Hyperglycemia  - Hemoglobin A1C; Future    10. Encounter for screening mammogram for breast cancer  - NANCY DIGITAL SCREEN W CAD BILATERAL; Future    11. Colon cancer screening  - POCT Fecal Immunochemical Test (FIT); Future              - All old blood work reviewed with the patient  - Appropriate prescription are addressed. - After visit summery provided.   - Questions answered and patient verbalizes understanding.  - Call for any problem, questions, or concerns. Return in about 6 weeks (around 2/9/2022).

## 2021-12-30 LAB
ESTIMATED AVERAGE GLUCOSE: 102.5 MG/DL
HBA1C MFR BLD: 5.2 %
HEMATOLOGY PATH CONSULT: NORMAL

## 2022-01-07 ENCOUNTER — VIRTUAL VISIT (OUTPATIENT)
Dept: PULMONOLOGY | Age: 57
End: 2022-01-07
Payer: COMMERCIAL

## 2022-01-07 DIAGNOSIS — F17.210 CIGARETTE SMOKER: ICD-10-CM

## 2022-01-07 DIAGNOSIS — J44.9 CHRONIC OBSTRUCTIVE PULMONARY DISEASE, UNSPECIFIED COPD TYPE (HCC): ICD-10-CM

## 2022-01-07 DIAGNOSIS — R91.1 LEFT UPPER LOBE PULMONARY NODULE: ICD-10-CM

## 2022-01-07 PROCEDURE — 99443 PR PHYS/QHP TELEPHONE EVALUATION 21-30 MIN: CPT | Performed by: INTERNAL MEDICINE

## 2022-01-07 ASSESSMENT — ENCOUNTER SYMPTOMS
BACK PAIN: 0
EYE ITCHING: 0
ABDOMINAL PAIN: 0
SHORTNESS OF BREATH: 0
ABDOMINAL DISTENTION: 0
COUGH: 0
EYE DISCHARGE: 0

## 2022-01-07 NOTE — ASSESSMENT & PLAN NOTE
Advised to quit smoking  Add Trelegy  Albuterol prn  Get yearly flu vaccine  PFT   and Low dose CT chest in 1 year

## 2022-01-07 NOTE — ASSESSMENT & PLAN NOTE
She has severe COPD  She has a 60 pk yr smoking  She has a 4 mm THA nodule  I'll repeat Low dose CT chest in 1 year

## 2022-01-07 NOTE — PROGRESS NOTES
Jonny Dora  1965  Referring Provider: Bertha Garcia MD    Subjective:     Chief Complaint   Patient presents with    Results       HPI  Kelsey Fountain is a 61 y.o. female is doing a telephone follow up visit. She has a 60 pk yr smoking and she is still smoking 1/2 pk per day. She has some cough, no phlegm, no hemoptysis, no loss of weight, good appetite, some SOBOE. She had no COVID vaccine but plan to get the flu vaccine. She had a Low dose CT chest done on 12/13/21 and it showed that she has a 4 mm THA. She had a PFT done on 12/13/21 showed that her FEV1 1.29 litres(49%) and DLCO of 66. Her 6 MWT showed good walking distance and no desaturation. Current Outpatient Medications   Medication Sig Dispense Refill    omeprazole (PRILOSEC) 40 MG delayed release capsule Take 40 mg by mouth daily      carvedilol (COREG) 3.125 MG tablet Take 1 tablet by mouth 2 times daily (with meals) 60 tablet 5    albuterol sulfate  (90 Base) MCG/ACT inhaler Inhale 2 puffs into the lungs every 4 hours (while awake) 18 g 3     No current facility-administered medications for this visit. No Known Allergies    Past Medical History:   Diagnosis Date    COVID-19 09/07/2021    History of stress test 10/14/2021    Normal EF 63 % with normal ventricular contractility.  Hypertension        Past Surgical History:   Procedure Laterality Date    CHOLECYSTECTOMY      DILATION AND CURETTAGE OF UTERUS      two of them       Social History     Socioeconomic History    Marital status:      Spouse name: Not on file    Number of children: Not on file    Years of education: Not on file    Highest education level: Not on file   Occupational History    Not on file   Tobacco Use    Smoking status: Current Every Day Smoker     Packs/day: 1.00     Types: Cigarettes    Smokeless tobacco: Never Used   Substance and Sexual Activity    Alcohol use:  Yes     Alcohol/week: 12.0 standard drinks     Types: 12 Cans of beer per week     Comment: 12 cans a day    Drug use: No    Sexual activity: Not on file   Other Topics Concern    Not on file   Social History Narrative    Not on file     Social Determinants of Health     Financial Resource Strain:     Difficulty of Paying Living Expenses: Not on file   Food Insecurity:     Worried About Running Out of Food in the Last Year: Not on file    Shana of Food in the Last Year: Not on file   Transportation Needs:     Lack of Transportation (Medical): Not on file    Lack of Transportation (Non-Medical): Not on file   Physical Activity:     Days of Exercise per Week: Not on file    Minutes of Exercise per Session: Not on file   Stress:     Feeling of Stress : Not on file   Social Connections:     Frequency of Communication with Friends and Family: Not on file    Frequency of Social Gatherings with Friends and Family: Not on file    Attends Nondenominational Services: Not on file    Active Member of [address] [place] [address] or Organizations: Not on file    Attends Club or Organization Meetings: Not on file    Marital Status: Not on file   Intimate Partner Violence:     Fear of Current or Ex-Partner: Not on file    Emotionally Abused: Not on file    Physically Abused: Not on file    Sexually Abused: Not on file   Housing Stability:     Unable to Pay for Housing in the Last Year: Not on file    Number of [place] in the Last Year: Not on file    Unstable Housing in the Last Year: Not on file       Review of Systems   Constitutional: Negative for fatigue. HENT: Negative for congestion and postnasal drip. Eyes: Negative for discharge and itching. Respiratory: Negative for cough and shortness of breath. Cardiovascular: Negative for chest pain and leg swelling. Gastrointestinal: Negative for abdominal distention and abdominal pain. Endocrine: Negative for cold intolerance and heat intolerance. Genitourinary: Negative for enuresis and frequency.    Musculoskeletal: Negative for arthralgias and back pain. Allergic/Immunologic: Negative for environmental allergies and food allergies. Neurological: Negative for light-headedness and headaches. Hematological: Negative for adenopathy. Psychiatric/Behavioral: Negative for agitation and behavioral problems. Objective: There were no vitals taken for this visit. There is no height or weight on file to calculate BMI.   Sleep Medicine 10/1/2021   Sitting and reading 0   Watching TV 1   Sitting, inactive in a public place (e.g. a theatre or a meeting) 0   As a passenger in a car for an hour without a break 2   Lying down to rest in the afternoon when circumstances permit 0   Sitting and talking to someone 0   Sitting quietly after a lunch without alcohol 0   In a car, while stopped for a few minutes in traffic 0   Total score 3   Neck circumference 15       Radiology: 4 mm THA nodule    Assessment and Plan     Problem List        Pulmonary Problems    Chronic obstructive pulmonary disease ([facility] [address]      Advised to quit smoking  Add Trelegy  Albuterol prn  Get yearly flu vaccine  PFT   and Low dose CT chest in 1 year         Relevant Medications    albuterol sulfate  (90 Base) MCG/ACT inhaler    Other Relevant Orders    Full PFT Study With Bronchodilator    Left upper lobe pulmonary nodule      She has severe COPD  She has a 60 pk yr smoking  She has a 4 mm THA nodule  I'll repeat Low dose CT chest in 1 year         Relevant Orders    CT LUNG SCREENING       Other    Cigarette smoker      Advised to quit smoking         Relevant Orders    CT LUNG SCREENING    Full PFT Study With Bronchodilator               Return in about 11 months (around 12/13/2022) for PFT, Low dose CT chest.  Follow-Up:    Return in about 11 months (around 12/13/2022) for PFT, Low dose CT chest.     Progress notes sent to the referring Provider    Scott Hood is a 61 y.o. female being evaluated by a Virtual Visit (video visit) encounter to address concerns as mentioned above. A caregiver was present when appropriate. Due to this being a TeleHealth encounter (During UQPKS-51 public health emergency), evaluation of the following organ systems was limited: Vitals/Constitutional/EENT/Resp/CV/GI//MS/Neuro/Skin/Heme-Lymph-Imm. Pursuant to the emergency declaration under the [address] [place] [address], [address] [place] [address] and the [employer] and [employer] Act, this Virtual Visit was conducted with patient's (and/or legal guardian's) consent, to reduce the patient's risk of exposure to COVID-19 and provide necessary medical care. The patient (and/or legal guardian) has also been advised to contact this office for worsening conditions or problems, and seek emergency medical treatment and/or call 911 if deemed necessary. Patient identification was verified at the start of the visit: Yes    Total time spent for this encounter: 21 minutes    Services were provided through a video synchronous discussion virtually to substitute for in-person clinic visit. Patient and provider were located at their individual homes. --Vamsi Balderrama MD on 1/7/2022 at 9:57 AM    An electronic signature was used to authenticate this note.      Vamsi Balderrama MD MD  1/7/2022  9:57 AM

## 2022-01-12 ENCOUNTER — TELEPHONE (OUTPATIENT)
Dept: FAMILY MEDICINE CLINIC | Age: 57
End: 2022-01-12

## 2022-01-12 ENCOUNTER — TELEPHONE (OUTPATIENT)
Dept: PULMONOLOGY | Age: 57
End: 2022-01-12

## 2022-01-12 DIAGNOSIS — K21.9 GASTROESOPHAGEAL REFLUX DISEASE WITHOUT ESOPHAGITIS: ICD-10-CM

## 2022-01-12 RX ORDER — ALBUTEROL SULFATE 90 UG/1
2 AEROSOL, METERED RESPIRATORY (INHALATION) EVERY 6 HOURS PRN
Qty: 18 G | Refills: 3 | Status: SHIPPED | OUTPATIENT
Start: 2022-01-12

## 2022-01-12 NOTE — TELEPHONE ENCOUNTER
----- Message from Dorie Lunsford sent at 1/12/2022  1:02 PM EST -----  Subject: Refill Request    QUESTIONS  Name of Medication? omeprazole (PRILOSEC) 40 MG delayed release capsule  Patient-reported dosage and instructions? 40 MG once a day   How many days do you have left? 21  Preferred Pharmacy? [facility] Pharmacy phone number (if available)? [phone]  ---------------------------------------------------------------------------  --------------  CALL BACK INFO  What is the best way for the office to contact you? OK to leave message on   voicemail  Preferred Call Back Phone Number?  [phone]

## 2022-01-12 NOTE — TELEPHONE ENCOUNTER
Pt called stating the rescue inhaler you prescribed was not sent in. If you could please do that so pt can .    Thank you

## 2022-01-12 NOTE — TELEPHONE ENCOUNTER
(PRILOSEC) 40 MG delayed release capsule   Patient-reported dosage and instructions? 40 MG once a day   How many days do you have left? 21   Preferred Pharmacy? [facility]    Pharmacy phone number (if available)? [phone]

## 2022-01-14 RX ORDER — OMEPRAZOLE 40 MG/1
40 CAPSULE, DELAYED RELEASE ORAL DAILY
Qty: 30 CAPSULE | Refills: 5 | Status: SHIPPED | OUTPATIENT
Start: 2022-01-14

## 2022-01-28 PROBLEM — Z13.220 LIPID SCREENING: Status: RESOLVED | Noted: 2021-12-29 | Resolved: 2022-01-28

## 2022-02-02 ENCOUNTER — TELEPHONE (OUTPATIENT)
Dept: FAMILY MEDICINE CLINIC | Age: 57
End: 2022-02-02

## 2022-02-03 NOTE — TELEPHONE ENCOUNTER
I called the patient, but no answer, please call her and tell her all the labs are WNL, except the liver enzymes little above normal will recheck in few omonths

## 2022-10-07 ENCOUNTER — COMMUNITY OUTREACH (OUTPATIENT)
Dept: FAMILY MEDICINE CLINIC | Age: 57
End: 2022-10-07

## 2022-10-07 NOTE — PROGRESS NOTES
Patient's HM shows they are overdue for Mammogram and Colorectal Screening. [employer] and  files searched. No results to attach to order nor HM updated.

## 2022-12-07 ENCOUNTER — TELEPHONE (OUTPATIENT)
Dept: PULMONOLOGY | Age: 57
End: 2022-12-07

## 2023-09-08 ENCOUNTER — INPATIENT HOSPITAL (OUTPATIENT)
Dept: URBAN - METROPOLITAN AREA HOSPITAL 56 | Facility: HOSPITAL | Age: 58
End: 2023-09-08

## 2023-09-08 DIAGNOSIS — E87.6 HYPOKALEMIA: ICD-10-CM

## 2023-09-08 DIAGNOSIS — K86.0 ALCOHOL-INDUCED CHRONIC PANCREATITIS: ICD-10-CM

## 2023-09-08 DIAGNOSIS — F10.11 ALCOHOL ABUSE, IN REMISSION: ICD-10-CM

## 2023-09-08 DIAGNOSIS — Z72.0 TOBACCO USE: ICD-10-CM

## 2023-09-08 PROCEDURE — 99222 1ST HOSP IP/OBS MODERATE 55: CPT | Performed by: NURSE PRACTITIONER

## 2023-09-09 ENCOUNTER — INPATIENT HOSPITAL (OUTPATIENT)
Dept: URBAN - METROPOLITAN AREA HOSPITAL 56 | Facility: HOSPITAL | Age: 58
End: 2023-09-09

## 2023-09-09 DIAGNOSIS — Z72.0 TOBACCO USE: ICD-10-CM

## 2023-09-09 DIAGNOSIS — E87.6 HYPOKALEMIA: ICD-10-CM

## 2023-09-09 DIAGNOSIS — K86.0 ALCOHOL-INDUCED CHRONIC PANCREATITIS: ICD-10-CM

## 2023-09-09 DIAGNOSIS — F10.11 ALCOHOL ABUSE, IN REMISSION: ICD-10-CM

## 2023-09-09 PROCEDURE — 99233 SBSQ HOSP IP/OBS HIGH 50: CPT | Performed by: PHYSICIAN ASSISTANT

## 2023-09-18 ENCOUNTER — HOSPITAL ENCOUNTER (OUTPATIENT)
Dept: INFUSION THERAPY | Age: 58
Discharge: HOME | End: 2023-09-18
Payer: COMMERCIAL

## 2023-09-18 ENCOUNTER — INITIAL CONSULT (OUTPATIENT)
Dept: ONCOLOGY | Age: 58
End: 2023-09-18
Payer: COMMERCIAL

## 2023-09-18 VITALS
SYSTOLIC BLOOD PRESSURE: 117 MMHG | HEART RATE: 101 BPM | DIASTOLIC BLOOD PRESSURE: 72 MMHG | OXYGEN SATURATION: 95 % | WEIGHT: 124 LBS | BODY MASS INDEX: 21.17 KG/M2 | TEMPERATURE: 98 F | HEIGHT: 64 IN

## 2023-09-18 DIAGNOSIS — D75.839 THROMBOCYTOSIS: ICD-10-CM

## 2023-09-18 DIAGNOSIS — D75.839 THROMBOCYTOSIS: Primary | ICD-10-CM

## 2023-09-18 LAB
ALBUMIN SERPL-MCNC: 3.2 GM/DL (ref 3.4–5)
ALP BLD-CCNC: 130 IU/L (ref 40–129)
ALT SERPL-CCNC: 11 U/L (ref 10–40)
ANION GAP SERPL CALCULATED.3IONS-SCNC: 11 MMOL/L (ref 4–16)
AST SERPL-CCNC: 20 IU/L (ref 15–37)
BASOPHILS ABSOLUTE: 0 K/CU MM
BASOPHILS RELATIVE PERCENT: 0.1 % (ref 0–1)
BILIRUB SERPL-MCNC: 0.5 MG/DL (ref 0–1)
BUN SERPL-MCNC: 11 MG/DL (ref 6–23)
CALCIUM SERPL-MCNC: 9.2 MG/DL (ref 8.3–10.6)
CHLORIDE BLD-SCNC: 95 MMOL/L (ref 99–110)
CO2: 27 MMOL/L (ref 21–32)
CREAT SERPL-MCNC: 0.4 MG/DL (ref 0.6–1.1)
DIFFERENTIAL TYPE: ABNORMAL
EOSINOPHILS ABSOLUTE: 0.2 K/CU MM
EOSINOPHILS RELATIVE PERCENT: 1.2 % (ref 0–3)
ERYTHROCYTE SEDIMENTATION RATE: 24 MM/HR (ref 0–30)
FERRITIN: 1410 NG/ML (ref 15–150)
FOLATE SERPL-MCNC: >20 NG/ML (ref 3.1–17.5)
GFR SERPL CREATININE-BSD FRML MDRD: >60 ML/MIN/1.73M2
GLUCOSE SERPL-MCNC: 90 MG/DL (ref 70–99)
HCT VFR BLD CALC: 33.2 % (ref 37–47)
HEMOGLOBIN: 10.9 GM/DL (ref 12.5–16)
IRON: 17 UG/DL (ref 37–145)
LACTATE DEHYDROGENASE: 151 IU/L (ref 120–246)
LYMPHOCYTES ABSOLUTE: 1.6 K/CU MM
LYMPHOCYTES RELATIVE PERCENT: 9.2 % (ref 24–44)
MCH RBC QN AUTO: 30.6 PG (ref 27–31)
MCHC RBC AUTO-ENTMCNC: 32.8 % (ref 32–36)
MCV RBC AUTO: 93.3 FL (ref 78–100)
MONOCYTES ABSOLUTE: 1.5 K/CU MM
MONOCYTES RELATIVE PERCENT: 8.5 % (ref 0–4)
PCT TRANSFERRIN: 11 % (ref 10–44)
PDW BLD-RTO: 14.5 % (ref 11.7–14.9)
PLATELET # BLD: 845 K/CU MM (ref 140–440)
PMV BLD AUTO: 8.5 FL (ref 7.5–11.1)
POTASSIUM SERPL-SCNC: 3.9 MMOL/L (ref 3.5–5.1)
RBC # BLD: 3.56 M/CU MM (ref 4.2–5.4)
SEGMENTED NEUTROPHILS ABSOLUTE COUNT: 14.5 K/CU MM
SEGMENTED NEUTROPHILS RELATIVE PERCENT: 81 % (ref 36–66)
SODIUM BLD-SCNC: 133 MMOL/L (ref 135–145)
TOTAL IRON BINDING CAPACITY: 156 UG/DL (ref 250–450)
TOTAL PROTEIN: 6 GM/DL (ref 6.4–8.2)
UNSATURATED IRON BINDING CAPACITY: 139 UG/DL (ref 110–370)
VITAMIN B-12: 334 PG/ML (ref 211–911)
WBC # BLD: 17.9 K/CU MM (ref 4–10.5)

## 2023-09-18 PROCEDURE — 82746 ASSAY OF FOLIC ACID SERUM: CPT

## 2023-09-18 PROCEDURE — 85652 RBC SED RATE AUTOMATED: CPT

## 2023-09-18 PROCEDURE — 82728 ASSAY OF FERRITIN: CPT

## 2023-09-18 PROCEDURE — 36415 COLL VENOUS BLD VENIPUNCTURE: CPT

## 2023-09-18 PROCEDURE — 83615 LACTATE (LD) (LDH) ENZYME: CPT

## 2023-09-18 PROCEDURE — 99211 OFF/OP EST MAY X REQ PHY/QHP: CPT

## 2023-09-18 PROCEDURE — 82607 VITAMIN B-12: CPT

## 2023-09-18 PROCEDURE — 3074F SYST BP LT 130 MM HG: CPT | Performed by: INTERNAL MEDICINE

## 2023-09-18 PROCEDURE — 85025 COMPLETE CBC W/AUTO DIFF WBC: CPT

## 2023-09-18 PROCEDURE — 83540 ASSAY OF IRON: CPT

## 2023-09-18 PROCEDURE — 83550 IRON BINDING TEST: CPT

## 2023-09-18 PROCEDURE — 99204 OFFICE O/P NEW MOD 45 MIN: CPT | Performed by: INTERNAL MEDICINE

## 2023-09-18 PROCEDURE — 3078F DIAST BP <80 MM HG: CPT | Performed by: INTERNAL MEDICINE

## 2023-09-18 PROCEDURE — 80053 COMPREHEN METABOLIC PANEL: CPT

## 2023-09-18 RX ORDER — POTASSIUM CHLORIDE 20 MEQ/1
20 TABLET, EXTENDED RELEASE ORAL DAILY
COMMUNITY
Start: 2023-09-09

## 2023-09-18 RX ORDER — MULTIPLE VITAMINS W/ MINERALS TAB 9MG-400MCG
1 TAB ORAL DAILY
COMMUNITY
Start: 2023-09-11

## 2023-09-18 RX ORDER — ASPIRIN 325 MG/1
100 TABLET, FILM COATED ORAL DAILY
COMMUNITY
Start: 2023-08-03

## 2023-09-18 RX ORDER — MAGNESIUM OXIDE 400 MG/1
1 TABLET ORAL DAILY
COMMUNITY
Start: 2023-09-09

## 2023-09-18 RX ORDER — FOLIC ACID 1 MG/1
1000 TABLET ORAL DAILY
COMMUNITY
Start: 2023-08-30

## 2023-09-18 RX ORDER — FLUTICASONE FUROATE AND VILANTEROL 100; 25 UG/1; UG/1
POWDER RESPIRATORY (INHALATION)
COMMUNITY
Start: 2023-08-23

## 2023-09-18 RX ORDER — LANOLIN ALCOHOL/MO/W.PET/CERES
100 CREAM (GRAM) TOPICAL DAILY
COMMUNITY
Start: 2023-08-11

## 2023-09-18 RX ORDER — NICOTINE 21 MG/24HR
PATCH, TRANSDERMAL 24 HOURS TRANSDERMAL
COMMUNITY
Start: 2023-08-04

## 2023-09-18 RX ORDER — HYDROXYZINE HYDROCHLORIDE 10 MG/1
TABLET, FILM COATED ORAL
COMMUNITY
Start: 2023-08-23

## 2023-09-18 RX ORDER — PREGABALIN 50 MG/1
CAPSULE ORAL
COMMUNITY
Start: 2023-09-09

## 2023-09-18 ASSESSMENT — PATIENT HEALTH QUESTIONNAIRE - PHQ9
SUM OF ALL RESPONSES TO PHQ9 QUESTIONS 1 & 2: 0
SUM OF ALL RESPONSES TO PHQ QUESTIONS 1-9: 0
1. LITTLE INTEREST OR PLEASURE IN DOING THINGS: 0
SUM OF ALL RESPONSES TO PHQ QUESTIONS 1-9: 0
SUM OF ALL RESPONSES TO PHQ QUESTIONS 1-9: 0
2. FEELING DOWN, DEPRESSED OR HOPELESS: 0
SUM OF ALL RESPONSES TO PHQ QUESTIONS 1-9: 0

## 2023-09-18 NOTE — PROGRESS NOTES
MA Rooming Questions  Patient: Liliana Falcon  MRN: [id number]    Date: 9/18/2023        1NEW PATIENT   5. Did the patient have a depression screening completed today?  Yes    PHQ-9 Total Score: 0 (9/18/2023  3:31 PM)       PHQ-9 Given to (if applicable):               PHQ-9 Score (if applicable):                     [] Positive     []  Negative              Does question #9 need addressed (if applicable)                     [] Yes    []  No               Babak Reyes CMA
DECREASED 09/08/2021     No results found for: \"ESR\"  Chemistry:  Lab Results   Component Value Date     12/29/2021    K 3.8 12/29/2021    CL 97 (L) 12/29/2021    CO2 25 12/29/2021    BUN 3 (L) 12/29/2021    CREATININE <0.5 (L) 12/29/2021    GLUCOSE 148 (H) 09/10/2021    CALCIUM 9.9 12/29/2021    PROT 7.2 12/29/2021    LABALBU 4.1 12/29/2021    BILITOT 0.5 12/29/2021    ALKPHOS 166 (H) 12/29/2021    AST 58 (H) 12/29/2021    ALT 38 12/29/2021    LABGLOM >60 12/29/2021    GFRAA >60 12/29/2021    AGRATIO 1.3 12/29/2021    MG 1.7 05/04/2013    POCGLU 92 09/07/2021     Lab Results   Component Value Date     (H) 09/08/2021     No results found for: \"LD\"  Lab Results   Component Value Date    TSHHS 0.851 05/04/2013    T4FREE 1.2 12/29/2021     Immunology:  Lab Results   Component Value Date    PROT 7.2 12/29/2021     No results found for: \"KAPPAUVOL\", \"LAMBDAUVOL\", \"KLFLCR\"  No results found for: \"B2M\"  Coagulation Panel:  Lab Results   Component Value Date    PROTIME 9.2 (L) 09/08/2021    INR 0.72 09/08/2021    APTT 36.6 09/08/2021    DDIMER 479 (H) 09/10/2021     Anemia Panel:  Lab Results   Component Value Date    CDFSHPQV37 296 12/29/2021    FOLATE 2.99 (L) 12/29/2021     Tumor Markers:  No results found for: \"\", \"CEA\", \"\", \"LABCA2\", \"PSA\"     Observations:  PHQ-9 Total Score: 0 (9/18/2023  3:31 PM)    Assessment: Thrombocytosis  Leukocytosis   Anemia    Plan:  Michaela Soto is a 61 y.o. female who was admitted to [facility] between 8/1/23 and 8/2/86 with alcoholic pancreatitis. At that time, her platelet count was normal (platelet count 034). Repeat labs on 8/9/23 and 8/17/23 by primary care physician showed thrombocytosis (platelet count 440 and 734 respectively). She then readmitted to [facility] on 9/7/23 with acute on chronic pancreatitis. Her platelet count was mildly elevated at that time (~ 450). She was also noted to have mild leukocytosis and mild anemia.       Since she has completely normal

## 2023-09-25 ENCOUNTER — OFFICE (OUTPATIENT)
Dept: URBAN - METROPOLITAN AREA CLINIC 18 | Facility: CLINIC | Age: 58
End: 2023-09-25

## 2023-09-25 VITALS
SYSTOLIC BLOOD PRESSURE: 104 MMHG | HEART RATE: 88 BPM | WEIGHT: 124 LBS | DIASTOLIC BLOOD PRESSURE: 66 MMHG | HEIGHT: 64 IN | OXYGEN SATURATION: 98 %

## 2023-09-25 DIAGNOSIS — K86.9 DISEASE OF PANCREAS, UNSPECIFIED: ICD-10-CM

## 2023-09-25 DIAGNOSIS — R68.81 EARLY SATIETY: ICD-10-CM

## 2023-09-25 DIAGNOSIS — R10.13 EPIGASTRIC PAIN: ICD-10-CM

## 2023-09-25 PROCEDURE — 99214 OFFICE O/P EST MOD 30 MIN: CPT | Performed by: INTERNAL MEDICINE

## 2023-10-05 ENCOUNTER — HOSPITAL ENCOUNTER (OUTPATIENT)
Dept: INFUSION THERAPY | Age: 58
Discharge: HOME | End: 2023-10-05
Payer: COMMERCIAL

## 2023-10-05 ENCOUNTER — OFFICE VISIT (OUTPATIENT)
Dept: ONCOLOGY | Age: 58
End: 2023-10-05
Payer: COMMERCIAL

## 2023-10-05 VITALS
BODY MASS INDEX: 21.31 KG/M2 | TEMPERATURE: 97.8 F | WEIGHT: 124.8 LBS | HEART RATE: 87 BPM | OXYGEN SATURATION: 93 % | HEIGHT: 64 IN | DIASTOLIC BLOOD PRESSURE: 77 MMHG | SYSTOLIC BLOOD PRESSURE: 126 MMHG

## 2023-10-05 DIAGNOSIS — D75.839 THROMBOCYTOSIS: Primary | ICD-10-CM

## 2023-10-05 LAB
BCR/ABL T(9,22): NORMAL
JAK2 P.V617F BLD/T QL: NORMAL

## 2023-10-05 PROCEDURE — 99213 OFFICE O/P EST LOW 20 MIN: CPT | Performed by: INTERNAL MEDICINE

## 2023-10-05 PROCEDURE — 3074F SYST BP LT 130 MM HG: CPT | Performed by: INTERNAL MEDICINE

## 2023-10-05 PROCEDURE — 3078F DIAST BP <80 MM HG: CPT | Performed by: INTERNAL MEDICINE

## 2023-10-05 PROCEDURE — 99211 OFF/OP EST MAY X REQ PHY/QHP: CPT

## 2023-10-05 NOTE — PROGRESS NOTES
MA Rooming Questions  Patient: Adriana Butler  MRN: [id number]    Date: 10/5/2023        1. Do you have any new issues?   no         2. Do you need any refills on medications?    no    3. Have you had any imaging done since your last visit? yes - LABS 9/18    4. Have you been hospitalized or seen in the emergency room since your last visit here?   no    5. Did the patient have a depression screening completed today?  No    No data recorded     PHQ-9 Given to (if applicable):               PHQ-9 Score (if applicable):                     [] Positive     []  Negative              Does question #9 need addressed (if applicable)                     [] Yes    []  No               Volodymyr Crane CMA
05/04/2013    T4FREE 1.2 12/29/2021     Immunology:  Lab Results   Component Value Date    PROT 6.0 (L) 09/18/2023     No results found for: \"KAPPAUVOL\", \"LAMBDAUVOL\", \"KLFLCR\"  No results found for: \"B2M\"  Coagulation Panel:  Lab Results   Component Value Date    PROTIME 9.2 (L) 09/08/2021    INR 0.72 09/08/2021    APTT 36.6 09/08/2021    DDIMER 479 (H) 09/10/2021     Anemia Panel:  Lab Results   Component Value Date    NHIWAISG42 334.0 09/18/2023    FOLATE >20.0 (H) 09/18/2023     Tumor Markers:  No results found for: \"\", \"CEA\", \"\", \"LABCA2\", \"PSA\"     Observations:  No data recorded    Assessment: Thrombocytosis  Leukocytosis   Anemia    Plan:  Milka Anderson is a 61 y.o. female who was admitted to [facility] between 8/1/23 and 0/4/61 with alcoholic pancreatitis. At that time, her platelet count was normal (platelet count 840). Repeat labs on 8/9/23 and 8/17/23 by primary care physician showed thrombocytosis (platelet count 224 and 734 respectively). She then readmitted to [facility] on 9/7/23 with acute on chronic pancreatitis. Her platelet count was mildly elevated at that time (~ 450). She was also noted to have mild leukocytosis and mild anemia. Laboratory work ups done on 9/18/23 showed leukocytosis (WBC 17.9), mild anemia (Hb 10.9, Hct 33.2, MCV 93.3), thrombocytosis (platelet 047,080/JSJN). Iron study was consistent with anemia of chronic disease (ferritin 1410, iron 17, TIBC 156, transferrin 11%). The rests of the blood tests, including B12, folate, ESR, LDH, BCR-ABL1, JAK2 V617F, JAK2 exon 12-13, MPL and CALR were within normal range. On October 5, 2023, she presented to me for follow up. Reviewed with her findings on labs done on 9/18/23. Since all the work ups are normal and she has normal platelet count before, I believe her thrombocytosis is due to reactive process d/t pancreatitis. I recommend for observation and will re check her platelet count in 6 weeks.  I asked her to continue

## 2023-11-09 ENCOUNTER — OFFICE (OUTPATIENT)
Dept: URBAN - METROPOLITAN AREA CLINIC 18 | Facility: CLINIC | Age: 58
End: 2023-11-09

## 2023-11-09 VITALS
SYSTOLIC BLOOD PRESSURE: 122 MMHG | HEART RATE: 85 BPM | WEIGHT: 127 LBS | HEIGHT: 64 IN | DIASTOLIC BLOOD PRESSURE: 76 MMHG | OXYGEN SATURATION: 98 %

## 2023-11-09 DIAGNOSIS — K86.9 DISEASE OF PANCREAS, UNSPECIFIED: ICD-10-CM

## 2023-11-09 DIAGNOSIS — R10.13 EPIGASTRIC PAIN: ICD-10-CM

## 2023-11-09 PROCEDURE — 99214 OFFICE O/P EST MOD 30 MIN: CPT | Performed by: INTERNAL MEDICINE

## 2025-02-18 ENCOUNTER — APPOINTMENT (OUTPATIENT)
Dept: CT IMAGING | Age: 60
End: 2025-02-18
Payer: COMMERCIAL

## 2025-02-18 ENCOUNTER — HOSPITAL ENCOUNTER (EMERGENCY)
Age: 60
Discharge: ANOTHER HOSPITAL | End: 2025-02-19
Attending: EMERGENCY MEDICINE
Payer: COMMERCIAL

## 2025-02-18 VITALS
BODY MASS INDEX: 19.46 KG/M2 | DIASTOLIC BLOOD PRESSURE: 93 MMHG | SYSTOLIC BLOOD PRESSURE: 140 MMHG | RESPIRATION RATE: 21 BRPM | HEIGHT: 64 IN | HEART RATE: 108 BPM | OXYGEN SATURATION: 90 % | TEMPERATURE: 98.3 F | WEIGHT: 114 LBS

## 2025-02-18 DIAGNOSIS — K86.1 ACUTE ON CHRONIC PANCREATITIS (HCC): Primary | ICD-10-CM

## 2025-02-18 DIAGNOSIS — E86.0 DEHYDRATION: ICD-10-CM

## 2025-02-18 DIAGNOSIS — K85.90 ACUTE ON CHRONIC PANCREATITIS (HCC): Primary | ICD-10-CM

## 2025-02-18 DIAGNOSIS — D72.829 LEUKOCYTOSIS, UNSPECIFIED TYPE: ICD-10-CM

## 2025-02-18 LAB
ALBUMIN SERPL-MCNC: 4.1 G/DL (ref 3.4–5)
ALBUMIN/GLOB SERPL: 1.1 {RATIO} (ref 1.1–2.2)
ALP SERPL-CCNC: 122 U/L (ref 40–129)
ALT SERPL-CCNC: 17 U/L (ref 10–40)
ANION GAP SERPL CALCULATED.3IONS-SCNC: 18 MMOL/L (ref 9–17)
AST SERPL-CCNC: 32 U/L (ref 15–37)
BASOPHILS # BLD: 0.03 K/UL
BASOPHILS NFR BLD: 0 % (ref 0–1)
BILIRUB SERPL-MCNC: 0.7 MG/DL (ref 0–1)
BUN SERPL-MCNC: 12 MG/DL (ref 7–20)
CALCIUM SERPL-MCNC: 10.6 MG/DL (ref 8.3–10.6)
CHLORIDE SERPL-SCNC: 89 MMOL/L (ref 99–110)
CO2 SERPL-SCNC: 26 MMOL/L (ref 21–32)
CREAT SERPL-MCNC: 0.4 MG/DL (ref 0.6–1.2)
EOSINOPHIL # BLD: 0.13 K/UL
EOSINOPHILS RELATIVE PERCENT: 1 % (ref 0–3)
ERYTHROCYTE [DISTWIDTH] IN BLOOD BY AUTOMATED COUNT: 14.4 % (ref 11.7–14.9)
GFR, ESTIMATED: >90 ML/MIN/1.73M2
GLUCOSE SERPL-MCNC: 88 MG/DL (ref 74–99)
HCT VFR BLD AUTO: 48.3 % (ref 37–47)
HGB BLD-MCNC: 16.2 G/DL (ref 12.5–16)
IMM GRANULOCYTES # BLD AUTO: 0.03 K/UL
IMM GRANULOCYTES NFR BLD: 0 %
LACTATE BLDV-SCNC: 0.9 MMOL/L (ref 0.4–2)
LACTATE BLDV-SCNC: 1.1 MMOL/L (ref 0.4–2)
LIPASE SERPL-CCNC: 1867 U/L (ref 13–60)
LYMPHOCYTES NFR BLD: 1.24 K/UL
LYMPHOCYTES RELATIVE PERCENT: 10 % (ref 24–44)
MAGNESIUM SERPL-MCNC: 1.8 MG/DL (ref 1.8–2.4)
MCH RBC QN AUTO: 32 PG (ref 27–31)
MCHC RBC AUTO-ENTMCNC: 33.5 G/DL (ref 32–36)
MCV RBC AUTO: 95.3 FL (ref 78–100)
MONOCYTES NFR BLD: 1.5 K/UL
MONOCYTES NFR BLD: 12 % (ref 0–4)
NEUTROPHILS NFR BLD: 77 % (ref 36–66)
NEUTS SEG NFR BLD: 9.46 K/UL
PLATELET # BLD AUTO: 292 K/UL (ref 140–440)
PMV BLD AUTO: 10.9 FL (ref 7.5–11.1)
POTASSIUM SERPL-SCNC: 3 MMOL/L (ref 3.5–5.1)
PROT SERPL-MCNC: 7.8 G/DL (ref 6.4–8.2)
RBC # BLD AUTO: 5.07 M/UL (ref 4.2–5.4)
SODIUM SERPL-SCNC: 133 MMOL/L (ref 136–145)
WBC OTHER # BLD: 12.4 K/UL (ref 4–10.5)

## 2025-02-18 PROCEDURE — 6360000002 HC RX W HCPCS: Performed by: NURSE PRACTITIONER

## 2025-02-18 PROCEDURE — 96375 TX/PRO/DX INJ NEW DRUG ADDON: CPT

## 2025-02-18 PROCEDURE — 87040 BLOOD CULTURE FOR BACTERIA: CPT

## 2025-02-18 PROCEDURE — 96367 TX/PROPH/DG ADDL SEQ IV INF: CPT

## 2025-02-18 PROCEDURE — 85025 COMPLETE CBC W/AUTO DIFF WBC: CPT

## 2025-02-18 PROCEDURE — 80053 COMPREHEN METABOLIC PANEL: CPT

## 2025-02-18 PROCEDURE — 96361 HYDRATE IV INFUSION ADD-ON: CPT

## 2025-02-18 PROCEDURE — 6360000004 HC RX CONTRAST MEDICATION: Performed by: NURSE PRACTITIONER

## 2025-02-18 PROCEDURE — 93005 ELECTROCARDIOGRAM TRACING: CPT | Performed by: NURSE PRACTITIONER

## 2025-02-18 PROCEDURE — 96376 TX/PRO/DX INJ SAME DRUG ADON: CPT

## 2025-02-18 PROCEDURE — 83690 ASSAY OF LIPASE: CPT

## 2025-02-18 PROCEDURE — 2580000003 HC RX 258: Performed by: NURSE PRACTITIONER

## 2025-02-18 PROCEDURE — 74177 CT ABD & PELVIS W/CONTRAST: CPT

## 2025-02-18 PROCEDURE — 99285 EMERGENCY DEPT VISIT HI MDM: CPT

## 2025-02-18 PROCEDURE — 83735 ASSAY OF MAGNESIUM: CPT

## 2025-02-18 PROCEDURE — 96365 THER/PROPH/DIAG IV INF INIT: CPT

## 2025-02-18 PROCEDURE — 83605 ASSAY OF LACTIC ACID: CPT

## 2025-02-18 RX ORDER — HYDROMORPHONE HYDROCHLORIDE 1 MG/ML
0.5 INJECTION, SOLUTION INTRAMUSCULAR; INTRAVENOUS; SUBCUTANEOUS ONCE
Status: COMPLETED | OUTPATIENT
Start: 2025-02-18 | End: 2025-02-18

## 2025-02-18 RX ORDER — ONDANSETRON 2 MG/ML
4 INJECTION INTRAMUSCULAR; INTRAVENOUS ONCE
Status: COMPLETED | OUTPATIENT
Start: 2025-02-18 | End: 2025-02-18

## 2025-02-18 RX ORDER — POTASSIUM CHLORIDE 7.45 MG/ML
10 INJECTION INTRAVENOUS
Status: COMPLETED | OUTPATIENT
Start: 2025-02-18 | End: 2025-02-19

## 2025-02-18 RX ORDER — 0.9 % SODIUM CHLORIDE 0.9 %
1000 INTRAVENOUS SOLUTION INTRAVENOUS ONCE
Status: COMPLETED | OUTPATIENT
Start: 2025-02-18 | End: 2025-02-18

## 2025-02-18 RX ORDER — IOPAMIDOL 755 MG/ML
75 INJECTION, SOLUTION INTRAVASCULAR
Status: COMPLETED | OUTPATIENT
Start: 2025-02-18 | End: 2025-02-18

## 2025-02-18 RX ADMIN — IOPAMIDOL 75 ML: 755 INJECTION, SOLUTION INTRAVENOUS at 22:20

## 2025-02-18 RX ADMIN — PIPERACILLIN AND TAZOBACTAM 3375 MG: 3; .375 INJECTION, POWDER, LYOPHILIZED, FOR SOLUTION INTRAVENOUS at 21:07

## 2025-02-18 RX ADMIN — ONDANSETRON 4 MG: 2 INJECTION INTRAMUSCULAR; INTRAVENOUS at 21:08

## 2025-02-18 RX ADMIN — HYDROMORPHONE HYDROCHLORIDE 0.5 MG: 1 INJECTION, SOLUTION INTRAMUSCULAR; INTRAVENOUS; SUBCUTANEOUS at 21:08

## 2025-02-18 RX ADMIN — HYDROMORPHONE HYDROCHLORIDE 0.5 MG: 1 INJECTION, SOLUTION INTRAMUSCULAR; INTRAVENOUS; SUBCUTANEOUS at 23:06

## 2025-02-18 RX ADMIN — SODIUM CHLORIDE 1000 ML: 9 INJECTION, SOLUTION INTRAVENOUS at 21:07

## 2025-02-18 RX ADMIN — POTASSIUM CHLORIDE 10 MEQ: 7.46 INJECTION, SOLUTION INTRAVENOUS at 23:02

## 2025-02-18 ASSESSMENT — LIFESTYLE VARIABLES
HOW MANY STANDARD DRINKS CONTAINING ALCOHOL DO YOU HAVE ON A TYPICAL DAY: PATIENT DOES NOT DRINK
HOW OFTEN DO YOU HAVE A DRINK CONTAINING ALCOHOL: NEVER

## 2025-02-18 ASSESSMENT — PAIN DESCRIPTION - ORIENTATION: ORIENTATION: RIGHT;UPPER;LEFT

## 2025-02-18 ASSESSMENT — PAIN - FUNCTIONAL ASSESSMENT: PAIN_FUNCTIONAL_ASSESSMENT: 0-10

## 2025-02-18 ASSESSMENT — PAIN SCALES - GENERAL
PAINLEVEL_OUTOF10: 8
PAINLEVEL_OUTOF10: 10
PAINLEVEL_OUTOF10: 8

## 2025-02-18 ASSESSMENT — PAIN DESCRIPTION - PAIN TYPE: TYPE: ACUTE PAIN

## 2025-02-18 ASSESSMENT — PAIN DESCRIPTION - LOCATION
LOCATION: ABDOMEN
LOCATION: ABDOMEN

## 2025-02-18 NOTE — ED TRIAGE NOTES
Patient has had pancreas pain since 2/14. Has not been able to eat and drink much. Took hydrocodone at home at 1500.

## 2025-02-19 LAB
EKG ATRIAL RATE: 106 BPM
EKG DIAGNOSIS: NORMAL
EKG P AXIS: 80 DEGREES
EKG P-R INTERVAL: 198 MS
EKG Q-T INTERVAL: 344 MS
EKG QRS DURATION: 88 MS
EKG QTC CALCULATION (BAZETT): 456 MS
EKG R AXIS: 67 DEGREES
EKG T AXIS: 76 DEGREES
EKG VENTRICULAR RATE: 106 BPM

## 2025-02-19 PROCEDURE — 96366 THER/PROPH/DIAG IV INF ADDON: CPT

## 2025-02-19 PROCEDURE — 6360000002 HC RX W HCPCS: Performed by: EMERGENCY MEDICINE

## 2025-02-19 PROCEDURE — 96376 TX/PRO/DX INJ SAME DRUG ADON: CPT

## 2025-02-19 PROCEDURE — 93010 ELECTROCARDIOGRAM REPORT: CPT | Performed by: INTERNAL MEDICINE

## 2025-02-19 PROCEDURE — 6360000002 HC RX W HCPCS: Performed by: NURSE PRACTITIONER

## 2025-02-19 RX ORDER — HYDROMORPHONE HYDROCHLORIDE 1 MG/ML
0.5 INJECTION, SOLUTION INTRAMUSCULAR; INTRAVENOUS; SUBCUTANEOUS ONCE
Status: COMPLETED | OUTPATIENT
Start: 2025-02-19 | End: 2025-02-19

## 2025-02-19 RX ADMIN — HYDROMORPHONE HYDROCHLORIDE 0.5 MG: 1 INJECTION, SOLUTION INTRAMUSCULAR; INTRAVENOUS; SUBCUTANEOUS at 01:26

## 2025-02-19 RX ADMIN — POTASSIUM CHLORIDE 10 MEQ: 7.46 INJECTION, SOLUTION INTRAVENOUS at 00:26

## 2025-02-19 ASSESSMENT — PAIN SCALES - GENERAL: PAINLEVEL_OUTOF10: 9

## 2025-02-19 ASSESSMENT — PAIN DESCRIPTION - LOCATION: LOCATION: ABDOMEN

## 2025-02-19 NOTE — ED PROVIDER NOTES
[facility] EMERGENCY DEPARTMENT  EMERGENCY DEPARTMENT ENCOUNTER        Pt Name: Hermila Mccormack  MRN: [id number]  Birthdate 1965  Date of evaluation: 2/18/2025  Provider: MODESTO JIMENEZ - HAILE  PCP: Re Mejia MD    [place]. I have evaluated this patient.        Triage CHIEF COMPLAINT       Chief Complaint   Patient presents with    Pancreatitis     Started 2/14; pain; hx of pancreatitis      Dehydration     Nausea, no emesis, 4 days, decreased PO         HISTORY OF PRESENT ILLNESS      Chief Complaint: abdominal pain    Hermila Mccormack is a 59 y.o. female who presents for evaluation of upper abdominal pain for 4 days.  Has history of pancreatitis.  Has prior history of pancreatitis.  Pain has been worsening.  Has had nausea but no vomiting, poor oral intake for days.  Feels dehydrated and has had decreased urine output.  Has had fever to 101.  Denies URI symptoms, diarrhea.    History of ETOH abuse but has not had a drink for a couple of weeks.      Nursing Notes were all reviewed and agreed with or any disagreements were addressed in the HPI.    REVIEW OF SYSTEMS     Pertinent ROS as noted in HPI.      PAST MEDICAL HISTORY     Past Medical History:   Diagnosis Date    COPD (chronic obstructive pulmonary disease) (HCC)     COVID-19 09/07/2021    History of stress test 10/14/2021    Normal EF 63 % with normal ventricular contractility.    Hypertension     Pancreatitis        SURGICAL HISTORY     Past Surgical History:   Procedure Laterality Date    CHOLECYSTECTOMY      DILATION AND CURETTAGE OF UTERUS      3       CURRENTMEDICATIONS       Previous Medications    ALBUTEROL SULFATE  (90 BASE) MCG/ACT INHALER    Inhale 2 puffs into the lungs every 6 hours as needed for Wheezing or Shortness of Breath    FLUTICASONE FUROATE-VILANTEROL (BREO ELLIPTA) 100-25 MCG/ACT INHALER    inhale 1 puff by mouth and INTO THE LUNGS once daily    FOLIC ACID (FOLVITE) 1 MG TABLET    Take 1

## 2025-02-23 LAB
MICROORGANISM SPEC CULT: NORMAL
SERVICE CMNT-IMP: NORMAL
SPECIMEN DESCRIPTION: NORMAL

## 2025-02-25 LAB
MICROORGANISM SPEC CULT: NORMAL
SERVICE CMNT-IMP: NORMAL
SPECIMEN DESCRIPTION: NORMAL

## 2025-02-27 ENCOUNTER — OFFICE (OUTPATIENT)
Dept: URBAN - METROPOLITAN AREA CLINIC 18 | Age: 60
End: 2025-02-27
Payer: COMMERCIAL

## 2025-02-27 VITALS
HEIGHT: 64 IN | SYSTOLIC BLOOD PRESSURE: 124 MMHG | HEART RATE: 88 BPM | WEIGHT: 116 LBS | DIASTOLIC BLOOD PRESSURE: 76 MMHG | OXYGEN SATURATION: 94 %

## 2025-02-27 DIAGNOSIS — K86.1 OTHER CHRONIC PANCREATITIS: ICD-10-CM

## 2025-02-27 DIAGNOSIS — R10.13 EPIGASTRIC PAIN: ICD-10-CM

## 2025-02-27 LAB
CBC, PLATELET CT  AND  DIFF: ABS BASOPHIL: 0.1 K/UL
CBC, PLATELET CT  AND  DIFF: ABS EOSINOPHIL: 0.2 K/UL
CBC, PLATELET CT  AND  DIFF: ABS LYMPHOCYTE: 1.2 K/UL
CBC, PLATELET CT  AND  DIFF: ABS MONOCYTE: 0.5 K/UL
CBC, PLATELET CT  AND  DIFF: ABS NEUTROPHIL: 7.1 K/UL
CBC, PLATELET CT  AND  DIFF: BASOPHIL: 1 %
CBC, PLATELET CT  AND  DIFF: DIFFERENTIAL: (no result)
CBC, PLATELET CT  AND  DIFF: EOSINOPHIL: 2 %
CBC, PLATELET CT  AND  DIFF: HEMATOCRIT: 39.6 %
CBC, PLATELET CT  AND  DIFF: HEMOGLOBIN: 13 G/DL
CBC, PLATELET CT  AND  DIFF: LYMPHOCYTE: 13 % — LOW
CBC, PLATELET CT  AND  DIFF: MCH: 32.2 PG
CBC, PLATELET CT  AND  DIFF: MCHC: 32.8 G/DL
CBC, PLATELET CT  AND  DIFF: MCV: 98 FL
CBC, PLATELET CT  AND  DIFF: MONOCYTE: 5 %
CBC, PLATELET CT  AND  DIFF: MPV: 9.7 FL
CBC, PLATELET CT  AND  DIFF: NEUTROPHIL: 79 %
CBC, PLATELET CT  AND  DIFF: PLATELET COUNT: 721 K/UL — HIGH
CBC, PLATELET CT  AND  DIFF: RBC MORPHOLOGY: (no result)
CBC, PLATELET CT  AND  DIFF: RBC: 4.04 M/UL
CBC, PLATELET CT  AND  DIFF: RDW: 13 %
CBC, PLATELET CT  AND  DIFF: WBC COUNT: 9 K/UL
COMPREHENSIVE METABOLIC PANEL: A/G RATIO: 1.6 RATIO
COMPREHENSIVE METABOLIC PANEL: ALBUMIN: 3.9 G/DL
COMPREHENSIVE METABOLIC PANEL: ALK PHOSPHATASE: 104 U/L
COMPREHENSIVE METABOLIC PANEL: ALT: 19 U/L
COMPREHENSIVE METABOLIC PANEL: AST: 21 U/L
COMPREHENSIVE METABOLIC PANEL: BILIRUBIN,TOTAL: 0.3 MG/DL
COMPREHENSIVE METABOLIC PANEL: BLOOD UREA NITROGEN: 12 MG/DL
COMPREHENSIVE METABOLIC PANEL: BUN/CREAT RATIO: 17
COMPREHENSIVE METABOLIC PANEL: CALCIUM: 9.2 MG/DL
COMPREHENSIVE METABOLIC PANEL: CHLORIDE: 100 MEQ/L
COMPREHENSIVE METABOLIC PANEL: CO2: 28 MEQ/L
COMPREHENSIVE METABOLIC PANEL: CREATININE: 0.7 MG/DL
COMPREHENSIVE METABOLIC PANEL: FASTING STATUS: (no result)
COMPREHENSIVE METABOLIC PANEL: GLOBULIN: 2.4 G/DL
COMPREHENSIVE METABOLIC PANEL: GLOMERULAR FILTRATION RATE (GFR): 99 MLS/MIN/1.73M2
COMPREHENSIVE METABOLIC PANEL: GLUCOSE,RANDOM: 123 MG/DL — HIGH
COMPREHENSIVE METABOLIC PANEL: POTASSIUM: 3.7 MEQ/L
COMPREHENSIVE METABOLIC PANEL: SODIUM: 142 MEQ/L
COMPREHENSIVE METABOLIC PANEL: TOTAL PROTEIN: 6.3 G/DL
LIPASE: 185 U/L — HIGH

## 2025-02-27 PROCEDURE — 99214 OFFICE O/P EST MOD 30 MIN: CPT | Performed by: INTERNAL MEDICINE

## 2025-05-07 ENCOUNTER — OFFICE (OUTPATIENT)
Dept: URBAN - METROPOLITAN AREA CLINIC 18 | Age: 60
End: 2025-05-07
Payer: COMMERCIAL

## 2025-05-07 VITALS
HEIGHT: 64 IN | WEIGHT: 117 LBS | HEART RATE: 74 BPM | SYSTOLIC BLOOD PRESSURE: 134 MMHG | DIASTOLIC BLOOD PRESSURE: 80 MMHG | OXYGEN SATURATION: 98 %

## 2025-05-07 DIAGNOSIS — K86.9 DISEASE OF PANCREAS, UNSPECIFIED: ICD-10-CM

## 2025-05-07 DIAGNOSIS — R10.13 EPIGASTRIC PAIN: ICD-10-CM

## 2025-05-07 PROCEDURE — 99213 OFFICE O/P EST LOW 20 MIN: CPT | Performed by: HEALTH EDUCATOR

## 2025-05-07 RX ORDER — HYDROCODONE BITARTRATE AND ACETAMINOPHEN 5; 325 MG/1; MG/1
TABLET ORAL
Qty: 20 | Refills: 0 | Status: ACTIVE
Start: 2025-02-27